# Patient Record
Sex: FEMALE | Race: WHITE | NOT HISPANIC OR LATINO | URBAN - METROPOLITAN AREA
[De-identification: names, ages, dates, MRNs, and addresses within clinical notes are randomized per-mention and may not be internally consistent; named-entity substitution may affect disease eponyms.]

---

## 2017-09-26 ENCOUNTER — EMERGENCY (EMERGENCY)
Facility: HOSPITAL | Age: 77
LOS: 1 days | Discharge: HOME | End: 2017-09-26
Attending: EMERGENCY MEDICINE
Payer: COMMERCIAL

## 2017-09-26 VITALS
TEMPERATURE: 98 F | DIASTOLIC BLOOD PRESSURE: 78 MMHG | HEART RATE: 56 BPM | HEIGHT: 66 IN | WEIGHT: 169.09 LBS | OXYGEN SATURATION: 95 % | SYSTOLIC BLOOD PRESSURE: 146 MMHG | RESPIRATION RATE: 18 BRPM

## 2017-09-26 VITALS
SYSTOLIC BLOOD PRESSURE: 142 MMHG | HEART RATE: 60 BPM | DIASTOLIC BLOOD PRESSURE: 70 MMHG | RESPIRATION RATE: 20 BRPM | OXYGEN SATURATION: 98 %

## 2017-09-26 DIAGNOSIS — Z98.89 OTHER SPECIFIED POSTPROCEDURAL STATES: Chronic | ICD-10-CM

## 2017-09-26 LAB
ALBUMIN SERPL ELPH-MCNC: 4 G/DL — SIGNIFICANT CHANGE UP (ref 3.3–5.2)
ALP SERPL-CCNC: 65 U/L — SIGNIFICANT CHANGE UP (ref 40–120)
ALT FLD-CCNC: 9 U/L — SIGNIFICANT CHANGE UP
ANION GAP SERPL CALC-SCNC: 12 MMOL/L — SIGNIFICANT CHANGE UP (ref 5–17)
AST SERPL-CCNC: 12 U/L — SIGNIFICANT CHANGE UP
BASOPHILS # BLD AUTO: 0 K/UL — SIGNIFICANT CHANGE UP (ref 0–0.2)
BASOPHILS NFR BLD AUTO: 0.3 % — SIGNIFICANT CHANGE UP (ref 0–2)
BILIRUB SERPL-MCNC: 0.3 MG/DL — LOW (ref 0.4–2)
BUN SERPL-MCNC: 26 MG/DL — HIGH (ref 8–20)
CALCIUM SERPL-MCNC: 9.4 MG/DL — SIGNIFICANT CHANGE UP (ref 8.6–10.2)
CHLORIDE SERPL-SCNC: 104 MMOL/L — SIGNIFICANT CHANGE UP (ref 98–107)
CO2 SERPL-SCNC: 27 MMOL/L — SIGNIFICANT CHANGE UP (ref 22–29)
CREAT SERPL-MCNC: 1.03 MG/DL — SIGNIFICANT CHANGE UP (ref 0.5–1.3)
EOSINOPHIL # BLD AUTO: 0.1 K/UL — SIGNIFICANT CHANGE UP (ref 0–0.5)
EOSINOPHIL NFR BLD AUTO: 1.3 % — SIGNIFICANT CHANGE UP (ref 0–6)
GLUCOSE SERPL-MCNC: 100 MG/DL — SIGNIFICANT CHANGE UP (ref 70–115)
HCT VFR BLD CALC: 43.7 % — SIGNIFICANT CHANGE UP (ref 37–47)
HGB BLD-MCNC: 13.8 G/DL — SIGNIFICANT CHANGE UP (ref 12–16)
LIDOCAIN IGE QN: 58 U/L — HIGH (ref 22–51)
LYMPHOCYTES # BLD AUTO: 1.2 K/UL — SIGNIFICANT CHANGE UP (ref 1–4.8)
LYMPHOCYTES # BLD AUTO: 13.2 % — LOW (ref 20–55)
MCHC RBC-ENTMCNC: 30.1 PG — SIGNIFICANT CHANGE UP (ref 27–31)
MCHC RBC-ENTMCNC: 31.6 G/DL — LOW (ref 32–36)
MCV RBC AUTO: 95.4 FL — SIGNIFICANT CHANGE UP (ref 81–99)
MONOCYTES # BLD AUTO: 0.4 K/UL — SIGNIFICANT CHANGE UP (ref 0–0.8)
MONOCYTES NFR BLD AUTO: 4.7 % — SIGNIFICANT CHANGE UP (ref 3–10)
NEUTROPHILS # BLD AUTO: 7.3 K/UL — SIGNIFICANT CHANGE UP (ref 1.8–8)
NEUTROPHILS NFR BLD AUTO: 80.4 % — HIGH (ref 37–73)
PLATELET # BLD AUTO: 225 K/UL — SIGNIFICANT CHANGE UP (ref 150–400)
POTASSIUM SERPL-MCNC: 4.1 MMOL/L — SIGNIFICANT CHANGE UP (ref 3.5–5.3)
POTASSIUM SERPL-SCNC: 4.1 MMOL/L — SIGNIFICANT CHANGE UP (ref 3.5–5.3)
PROT SERPL-MCNC: 6.4 G/DL — LOW (ref 6.6–8.7)
RBC # BLD: 4.58 M/UL — SIGNIFICANT CHANGE UP (ref 4.4–5.2)
RBC # FLD: 14.1 % — SIGNIFICANT CHANGE UP (ref 11–15.6)
SODIUM SERPL-SCNC: 143 MMOL/L — SIGNIFICANT CHANGE UP (ref 135–145)
WBC # BLD: 9.1 K/UL — SIGNIFICANT CHANGE UP (ref 4.8–10.8)
WBC # FLD AUTO: 9.1 K/UL — SIGNIFICANT CHANGE UP (ref 4.8–10.8)

## 2017-09-26 PROCEDURE — 80053 COMPREHEN METABOLIC PANEL: CPT

## 2017-09-26 PROCEDURE — 84484 ASSAY OF TROPONIN QUANT: CPT

## 2017-09-26 PROCEDURE — 93010 ELECTROCARDIOGRAM REPORT: CPT

## 2017-09-26 PROCEDURE — 85027 COMPLETE CBC AUTOMATED: CPT

## 2017-09-26 PROCEDURE — 83690 ASSAY OF LIPASE: CPT

## 2017-09-26 PROCEDURE — 36415 COLL VENOUS BLD VENIPUNCTURE: CPT

## 2017-09-26 PROCEDURE — 93005 ELECTROCARDIOGRAM TRACING: CPT

## 2017-09-26 PROCEDURE — 99283 EMERGENCY DEPT VISIT LOW MDM: CPT | Mod: 25

## 2017-09-26 PROCEDURE — 99285 EMERGENCY DEPT VISIT HI MDM: CPT

## 2017-09-26 NOTE — ED STATDOCS - CARE PLAN
Principal Discharge DX:	Dizziness Principal Discharge DX:	Dizziness  Secondary Diagnosis:	Epigastric pain

## 2017-09-26 NOTE — ED ADULT NURSE NOTE - OBJECTIVE STATEMENT
77 yof bibt a/ox3 co sharp epigastric pain followed by dizziness, and sob upon standing, intermittent, denies cp

## 2017-09-26 NOTE — ED STATDOCS - PROGRESS NOTE DETAILS
NP NOTE:  76 y/o F with PSH 9/19/16 CABG x 2 presents with c/o dizziness and intermittent epigastric pain.  A&O x 3, CN II-XII GI, FRYE x 4, 5/5 motors, = sensation, no ataxia, no pronator drift, negative Romberg.  Await labs. NP NOTE:  Labs reviewed, unremarkable.  Will d/c home with f/u Dr. Roman.  She has an appointment with him tomorrow at 1:45.

## 2017-09-26 NOTE — ED STATDOCS - ATTENDING CONTRIBUTION TO CARE
I, Manny Aparicio, performed the initial face to face bedside interview with this patient regarding history of present illness, review of symptoms and relevant past medical, social and family history.  I completed an independent physical examination.  I was the initial provider who evaluated this patient.  The history, relevant review of systems, past medical and surgical history, medical decision making, and physical examination was documented by the scribe in my presence and I attest to the accuracy of the documentation.  I have signed out the follow up of any pending tests (i.e. labs, radiological studies) to the ACP.  I have communicated the patient’s plan of care and disposition with the ACP.

## 2017-09-26 NOTE — ED STATDOCS - PMH
Vaughn esophagus    GERD (gastroesophageal reflux disease)    Hyperlipidemia    Hypertension    Palpitations    Paroxysmal atrial fibrillation    PVC's (premature ventricular contractions)

## 2017-09-26 NOTE — ED STATDOCS - MEDICAL DECISION MAKING DETAILS
nad neuro: CN II - XII intact, EOMI, PERRL, no papilledema, 5/5 muscle strength x 4 extremities, no sensory deficits, 2+ dtr globally, negative babinski, no ataxic gait, normal AMBROSIO and FNT, normal romberg   return to ed for intractable HA, persistent vomiting, or new onset motor/sensory deficits

## 2017-09-26 NOTE — ED STATDOCS - NEUROLOGICAL, MLM
No Kernig's or Bryzkinkis. Normal sensation all quadrants of face, normal strength in eyelids, symmetrical smile, tongue midline, normal shoulder shrugs, no pronator drift, normal thumb to other fingers, normal hand over hand, normal finger to nose, normal DTRs, normal strength in BUE and BLE, no Babinski's, normal knee-heel-shin, no Romberg's.

## 2017-09-26 NOTE — ED STATDOCS - OBJECTIVE STATEMENT
77 year old female presenting to the ED complaining of feeling dizziness and intermittent aching upper abdominal pain upon standing up x 1 week. Pt states that she has PSHx of open heart surgery. She states that she is currently on Metoprolol 25mg, Lisinopril, Simvastatin, HCTZ, and Aspirin 81mg. Denies HA, dizziness, numbness, tingling, photophobia, diplopia, change in vision/hearing/gait/mental status/speech, focal weakness, neck pain, rash, fever, chills, stiffness, abdominal pain, hx of DVT/PE, leg swelling, CP, SOB, palpitations, diaphoresis, N/V/D/C, change in urinary/bowel function, dysuria, hematuria, flank pain, malaise, or motor/sensory deficits

## 2017-11-28 ENCOUNTER — INPATIENT (INPATIENT)
Facility: HOSPITAL | Age: 77
LOS: 2 days | Discharge: HOME | DRG: 287 | End: 2017-12-01
Attending: HOSPITALIST | Admitting: FAMILY MEDICINE
Payer: COMMERCIAL

## 2017-11-28 VITALS
DIASTOLIC BLOOD PRESSURE: 56 MMHG | TEMPERATURE: 98 F | WEIGHT: 166.89 LBS | HEIGHT: 66 IN | OXYGEN SATURATION: 97 % | RESPIRATION RATE: 20 BRPM | HEART RATE: 54 BPM | SYSTOLIC BLOOD PRESSURE: 113 MMHG

## 2017-11-28 DIAGNOSIS — I49.3 VENTRICULAR PREMATURE DEPOLARIZATION: ICD-10-CM

## 2017-11-28 DIAGNOSIS — E78.5 HYPERLIPIDEMIA, UNSPECIFIED: ICD-10-CM

## 2017-11-28 DIAGNOSIS — Z29.9 ENCOUNTER FOR PROPHYLACTIC MEASURES, UNSPECIFIED: ICD-10-CM

## 2017-11-28 DIAGNOSIS — K21.9 GASTRO-ESOPHAGEAL REFLUX DISEASE WITHOUT ESOPHAGITIS: ICD-10-CM

## 2017-11-28 DIAGNOSIS — I25.10 ATHEROSCLEROTIC HEART DISEASE OF NATIVE CORONARY ARTERY WITHOUT ANGINA PECTORIS: ICD-10-CM

## 2017-11-28 DIAGNOSIS — K22.70 BARRETT'S ESOPHAGUS WITHOUT DYSPLASIA: ICD-10-CM

## 2017-11-28 DIAGNOSIS — I48.91 UNSPECIFIED ATRIAL FIBRILLATION: ICD-10-CM

## 2017-11-28 DIAGNOSIS — R07.9 CHEST PAIN, UNSPECIFIED: ICD-10-CM

## 2017-11-28 DIAGNOSIS — I10 ESSENTIAL (PRIMARY) HYPERTENSION: ICD-10-CM

## 2017-11-28 DIAGNOSIS — Z98.890 OTHER SPECIFIED POSTPROCEDURAL STATES: Chronic | ICD-10-CM

## 2017-11-28 DIAGNOSIS — Z98.89 OTHER SPECIFIED POSTPROCEDURAL STATES: Chronic | ICD-10-CM

## 2017-11-28 LAB
ALBUMIN SERPL ELPH-MCNC: 3.6 G/DL — SIGNIFICANT CHANGE UP (ref 3.3–5.2)
ALP SERPL-CCNC: 69 U/L — SIGNIFICANT CHANGE UP (ref 40–120)
ALT FLD-CCNC: 9 U/L — SIGNIFICANT CHANGE UP
ANION GAP SERPL CALC-SCNC: 11 MMOL/L — SIGNIFICANT CHANGE UP (ref 5–17)
AST SERPL-CCNC: 12 U/L — SIGNIFICANT CHANGE UP
BASOPHILS # BLD AUTO: 0 K/UL — SIGNIFICANT CHANGE UP (ref 0–0.2)
BASOPHILS NFR BLD AUTO: 0.5 % — SIGNIFICANT CHANGE UP (ref 0–2)
BILIRUB SERPL-MCNC: 0.3 MG/DL — LOW (ref 0.4–2)
BUN SERPL-MCNC: 18 MG/DL — SIGNIFICANT CHANGE UP (ref 8–20)
CALCIUM SERPL-MCNC: 9.6 MG/DL — SIGNIFICANT CHANGE UP (ref 8.6–10.2)
CHLORIDE SERPL-SCNC: 103 MMOL/L — SIGNIFICANT CHANGE UP (ref 98–107)
CO2 SERPL-SCNC: 29 MMOL/L — SIGNIFICANT CHANGE UP (ref 22–29)
CREAT SERPL-MCNC: 0.85 MG/DL — SIGNIFICANT CHANGE UP (ref 0.5–1.3)
D DIMER BLD IA.RAPID-MCNC: 216 NG/ML DDU — SIGNIFICANT CHANGE UP
EOSINOPHIL # BLD AUTO: 0.1 K/UL — SIGNIFICANT CHANGE UP (ref 0–0.5)
EOSINOPHIL NFR BLD AUTO: 1.1 % — SIGNIFICANT CHANGE UP (ref 0–6)
GLUCOSE SERPL-MCNC: 97 MG/DL — SIGNIFICANT CHANGE UP (ref 70–115)
HCT VFR BLD CALC: 41 % — SIGNIFICANT CHANGE UP (ref 37–47)
HGB BLD-MCNC: 13.2 G/DL — SIGNIFICANT CHANGE UP (ref 12–16)
INR BLD: 0.96 RATIO — SIGNIFICANT CHANGE UP (ref 0.88–1.16)
LIDOCAIN IGE QN: 36 U/L — SIGNIFICANT CHANGE UP (ref 22–51)
LYMPHOCYTES # BLD AUTO: 1.4 K/UL — SIGNIFICANT CHANGE UP (ref 1–4.8)
LYMPHOCYTES # BLD AUTO: 15.5 % — LOW (ref 20–55)
MCHC RBC-ENTMCNC: 30.8 PG — SIGNIFICANT CHANGE UP (ref 27–31)
MCHC RBC-ENTMCNC: 32.2 G/DL — SIGNIFICANT CHANGE UP (ref 32–36)
MCV RBC AUTO: 95.8 FL — SIGNIFICANT CHANGE UP (ref 81–99)
MONOCYTES # BLD AUTO: 0.5 K/UL — SIGNIFICANT CHANGE UP (ref 0–0.8)
MONOCYTES NFR BLD AUTO: 6 % — SIGNIFICANT CHANGE UP (ref 3–10)
NEUTROPHILS # BLD AUTO: 6.8 K/UL — SIGNIFICANT CHANGE UP (ref 1.8–8)
NEUTROPHILS NFR BLD AUTO: 76.7 % — HIGH (ref 37–73)
PLATELET # BLD AUTO: 252 K/UL — SIGNIFICANT CHANGE UP (ref 150–400)
POTASSIUM SERPL-MCNC: 4 MMOL/L — SIGNIFICANT CHANGE UP (ref 3.5–5.3)
POTASSIUM SERPL-SCNC: 4 MMOL/L — SIGNIFICANT CHANGE UP (ref 3.5–5.3)
PROT SERPL-MCNC: 6.3 G/DL — LOW (ref 6.6–8.7)
PROTHROM AB SERPL-ACNC: 10.6 SEC — SIGNIFICANT CHANGE UP (ref 9.8–12.7)
RBC # BLD: 4.28 M/UL — LOW (ref 4.4–5.2)
RBC # FLD: 12.7 % — SIGNIFICANT CHANGE UP (ref 11–15.6)
SODIUM SERPL-SCNC: 143 MMOL/L — SIGNIFICANT CHANGE UP (ref 135–145)
TROPONIN T SERPL-MCNC: <0.01 NG/ML — SIGNIFICANT CHANGE UP (ref 0–0.06)
WBC # BLD: 8.8 K/UL — SIGNIFICANT CHANGE UP (ref 4.8–10.8)
WBC # FLD AUTO: 8.8 K/UL — SIGNIFICANT CHANGE UP (ref 4.8–10.8)

## 2017-11-28 PROCEDURE — 93010 ELECTROCARDIOGRAM REPORT: CPT | Mod: 76

## 2017-11-28 PROCEDURE — 71010: CPT | Mod: 26

## 2017-11-28 PROCEDURE — 99285 EMERGENCY DEPT VISIT HI MDM: CPT

## 2017-11-28 PROCEDURE — 99223 1ST HOSP IP/OBS HIGH 75: CPT | Mod: GC

## 2017-11-28 RX ORDER — METOPROLOL TARTRATE 50 MG
25 TABLET ORAL DAILY
Qty: 0 | Refills: 0 | Status: DISCONTINUED | OUTPATIENT
Start: 2017-11-29 | End: 2017-11-29

## 2017-11-28 RX ORDER — FAMOTIDINE 10 MG/ML
20 INJECTION INTRAVENOUS AT BEDTIME
Qty: 0 | Refills: 0 | Status: DISCONTINUED | OUTPATIENT
Start: 2017-11-28 | End: 2017-11-29

## 2017-11-28 RX ORDER — SIMVASTATIN 20 MG/1
40 TABLET, FILM COATED ORAL AT BEDTIME
Qty: 0 | Refills: 0 | Status: DISCONTINUED | OUTPATIENT
Start: 2017-11-28 | End: 2017-12-01

## 2017-11-28 RX ORDER — HYOSCYAMINE SULFATE 0.13 MG
0.12 TABLET ORAL EVERY 8 HOURS
Qty: 0 | Refills: 0 | Status: DISCONTINUED | OUTPATIENT
Start: 2017-11-28 | End: 2017-11-28

## 2017-11-28 RX ORDER — SODIUM CHLORIDE 9 MG/ML
3 INJECTION INTRAMUSCULAR; INTRAVENOUS; SUBCUTANEOUS ONCE
Qty: 0 | Refills: 0 | Status: COMPLETED | OUTPATIENT
Start: 2017-11-28 | End: 2017-11-28

## 2017-11-28 RX ORDER — HYDROCHLOROTHIAZIDE 25 MG
12.5 TABLET ORAL DAILY
Qty: 0 | Refills: 0 | Status: DISCONTINUED | OUTPATIENT
Start: 2017-11-28 | End: 2017-12-01

## 2017-11-28 RX ORDER — ALPRAZOLAM 0.25 MG
0.25 TABLET ORAL EVERY 12 HOURS
Qty: 0 | Refills: 0 | Status: DISCONTINUED | OUTPATIENT
Start: 2017-11-28 | End: 2017-12-01

## 2017-11-28 RX ORDER — PANTOPRAZOLE SODIUM 20 MG/1
40 TABLET, DELAYED RELEASE ORAL
Qty: 0 | Refills: 0 | Status: DISCONTINUED | OUTPATIENT
Start: 2017-11-29 | End: 2017-11-29

## 2017-11-28 RX ORDER — ASPIRIN/CALCIUM CARB/MAGNESIUM 324 MG
81 TABLET ORAL DAILY
Qty: 0 | Refills: 0 | Status: DISCONTINUED | OUTPATIENT
Start: 2017-11-29 | End: 2017-12-01

## 2017-11-28 RX ORDER — ENOXAPARIN SODIUM 100 MG/ML
40 INJECTION SUBCUTANEOUS EVERY 24 HOURS
Qty: 0 | Refills: 0 | Status: DISCONTINUED | OUTPATIENT
Start: 2017-11-28 | End: 2017-12-01

## 2017-11-28 RX ORDER — LISINOPRIL 2.5 MG/1
20 TABLET ORAL DAILY
Qty: 0 | Refills: 0 | Status: DISCONTINUED | OUTPATIENT
Start: 2017-11-29 | End: 2017-12-01

## 2017-11-28 RX ADMIN — FAMOTIDINE 20 MILLIGRAM(S): 10 INJECTION INTRAVENOUS at 22:23

## 2017-11-28 RX ADMIN — SODIUM CHLORIDE 3 MILLILITER(S): 9 INJECTION INTRAMUSCULAR; INTRAVENOUS; SUBCUTANEOUS at 20:17

## 2017-11-28 RX ADMIN — SIMVASTATIN 40 MILLIGRAM(S): 20 TABLET, FILM COATED ORAL at 22:23

## 2017-11-28 NOTE — H&P ADULT - PROBLEM SELECTOR PLAN 3
Controled: 121/58  will hold lisinopril for now, will add as needed c/w statins  will do Lipid panel in the morning

## 2017-11-28 NOTE — H&P ADULT - ASSESSMENT
78 YOF w/ PMH of CAD s/p stentx2 ,ast one 09/2016, Jd esophagus, GERD, HLD, HTN, paroxismal, A-fib, bilateral cataracts admitted to rule out CAD 78 YOF w/ PMH of CAD s/p stentx2 ,ast one 09/2016, Jd esophagus, GERD, HLD, HTN, paroxismal, A-fib, bilateral cataracts admitted to rule out ACS

## 2017-11-28 NOTE — H&P ADULT - PROBLEM SELECTOR PLAN 5
Bigeminy noted on EKG  no acute st or t wave changes from previous EKG Last Endoscopy: 05/2017 as per pt  Will follow up as outpatient Last Endoscopy: 05/2017 as per pt  Saw GI yesterday and was told Last Endoscopy: 05/2017 as per pt  Saw GI yesterday and was told to continue Omeprazole and Ranitidine.

## 2017-11-28 NOTE — H&P ADULT - NSHPREVIEWOFSYSTEMS_GEN_ALL_CORE
CONSTITUTIONAL: No weakness, fevers or chills  EYES/ENT:BL cataraths:  No vertigo or throat pain   NECK: No pain or stiffness  RESPIRATORY: No cough, wheezing, hemoptysis.  CARDIOVASCULAR: No chest pain or palpitations  GASTROINTESTINAL: No nausea, vomiting, or hematemesis; No diarrhea or constipation. No melena or hematochezia.  GENITOURINARY: No dysuria, frequency or hematuria  NEUROLOGICAL: No numbness or weakness  SKIN: No itching, burning, rashes, or lesions   All other review of systems is negative unless indicated above.

## 2017-11-28 NOTE — ED ADULT NURSE NOTE - OBJECTIVE STATEMENT
patient c/o shortness of breath for 2 months with discomfort in her epigastric area, went to multiple specialists, including cardio and GI, and wasn't given an answer on her symptoms, yesterday she began having left facial side discomfort, no chest pain, has cardiac history.

## 2017-11-28 NOTE — H&P ADULT - PROBLEM SELECTOR PLAN 1
Non controlled since pt mentions reflux which has worsened recently  Likely the cause of this pt's epigastric pain since she has epigastric tenderness and the pain is reproducible when she leans forward  EKG done, will repeat in the morning  Admit to hiospitalist service, 4TW CTU  Activity: bed rest  Diet: DASH  Pt is on omeprazol 20mg and ranitidine 300mg. Will continue inpatient Liekly ACS due to this pt's high risk, vs GERD since the pain is reproducible when she leans forward  EKG done, will repeat in the morning  Admit to hiospitalist service, 4TW CTU  Activity: as tolerated  Diet: DASH  Repeat EKG in the morning  1st troponins engative, will repeat in the morning  c/w metoprolol 20mg  c/w ASA 81mg  Pt is on omeprazol 20mg and ranitidine 300mg. Will continue inpatient  cardio consulted, will wait for recommendations  Admitt to lo4TW CTU  Will do TTE (Last TTE: 09/2016,LVEF 55-60%, G1 dyastolic dysfunction) rule out ACS due to this pt's high risk, vs GERD since the pain is reproducible when she leans forward  EKG done, will repeat in the morning  Admit to hiospitalist service, 4TW CTU  Activity: as tolerated  Diet: DASH  Repeat EKG in the morning  1st troponins negative, will repeat in the morning  c/w metoprolol 20mg  c/w ASA 81mg  Pt is on omeprazol 20mg and ranitidine 300mg. Will continue inpatient  cardio consulted  Will do TTE (Last TTE: 09/2016,LVEF 55-60%, G1 dyastolic dysfunction) rule out ACS     Admit to hospitalist service, 4TW CTU  Activity: as tolerated  Diet: DASH  Repeat EKG in the morning  1st troponins negative, will repeat in the morning  c/w metoprolol 20mg  c/w ASA 81mg  c/w Simvastatin  cardio consulted  Will do TTE (Last TTE: 09/2016,LVEF 55-60%, G1 dyastolic dysfunction)

## 2017-11-28 NOTE — ED ADULT TRIAGE NOTE - CHIEF COMPLAINT QUOTE
Patient c/o SOB/STANLEY that started couple of weeks ago, last night reports jaw pain, hx of double bypass.

## 2017-11-28 NOTE — ED PROVIDER NOTE - MEDICAL DECISION MAKING DETAILS
chest pain and sob will get w/u and consult with pts cardiologist and dispo as per their recommendation

## 2017-11-28 NOTE — H&P ADULT - PROBLEM SELECTOR PLAN 2
Will rule out as the cause of chest pain since pt has a heart disease history  EKG done, will repeat in the morning  Will trend troponins  Cardio consulted, will follow recs  Last TTE: 09/2016,LVEF 55-60%, G1 dyastolic dysfunction Will rule out as the cause of chest pain since pt has a heart disease history  EKG done, will repeat in the morning  Will trend troponins  Cardio consulted, will follow recs  Last TTE: 09/2016,LVEF 55-60%, G1 dyastolic dysfunction  c/w ASA 81mg  c/w statins  c/w metoprolol 20mg Controled: 121/58  c/w lisinopril w/holding parameters Controlled: 121/58  c/w lisinopril w/holding parameters

## 2017-11-28 NOTE — H&P ADULT - NSHPPHYSICALEXAM_GEN_ALL_CORE
Vital Signs Last 24 Hrs  T(C): 36.7 (28 Nov 2017 19:17), Max: 36.7 (28 Nov 2017 19:17)  T(F): 98 (28 Nov 2017 19:17), Max: 98 (28 Nov 2017 19:17)  HR: 51 (28 Nov 2017 19:17) (51 - 54)  BP: 121/58 (28 Nov 2017 19:17) (113/56 - 121/58)  RR: 18 (28 Nov 2017 19:17) (18 - 20)  SpO2: 97% (28 Nov 2017 19:17) (97% - 97%)    Tele: bigeminy    General: WN/WD NAD  Neurology: A&Ox3, non focalization signs  Head:  Normocephalic, atraumatic  ENT:  Mucosa moist, no ulcerations  Neck:  Supple, no sinuses or palpable masses  Respiratory: CTA B/L  CV: RRR, S1S2, no murmur  Chest: surgical scar noted on sternum   Abdominal: Soft, epigastric tenderness, non distended, no palpable mass  MSK: No edema, + peripheral pulses, Vital Signs Last 24 Hrs  T(C): 36.7 (28 Nov 2017 19:17), Max: 36.7 (28 Nov 2017 19:17)  T(F): 98 (28 Nov 2017 19:17), Max: 98 (28 Nov 2017 19:17)  HR: 51 (28 Nov 2017 19:17) (51 - 54)  BP: 121/58 (28 Nov 2017 19:17) (113/56 - 121/58)  RR: 18 (28 Nov 2017 19:17) (18 - 20)  SpO2: 97% (28 Nov 2017 19:17) (97% - 97%)    Tele: bigeminy    General: WN/WD NAD  Neurology: A&Ox3, non focalization signs  Head:  Normocephalic, atraumatic  ENT:  Mucosa moist, no ulcerations  Neck:  Supple, no sinuses or palpable masses  Respiratory: CTA B/L  CV: bradycardic, regular rhythm, S1S2, no murmur  Chest: surgical scar noted on sternum   Abdominal: Soft, epigastric tenderness, non distended, no palpable mass  MSK: No edema, + peripheral pulses,

## 2017-11-28 NOTE — ED PROVIDER NOTE - MUSCULOSKELETAL, MLM
Spine appears normal, range of motion is not limited, no muscle or joint tenderness except chest left upper chest wall tenderness reproduces pts symptoms of chest pain exactly

## 2017-11-28 NOTE — H&P ADULT - PROBLEM SELECTOR PLAN 4
Paroxysmal since now is on sinus rythm  c/w metoprolol 20mg   Cardio consulted, will wait for recs Paroxysmal since now is on sinus rhythm, not on anticoagulation as per cardio recs  c/w metoprolol 20mg   Cardio consulted, will wait for recs Paroxysmal since now is on sinus rhythm, not on anticoagulation   c/w metoprolol 20mg and aspirin 81 mg

## 2017-11-28 NOTE — H&P ADULT - HISTORY OF PRESENT ILLNESS
78 YOF w/ PMH of CAD s/p stentx2 ,ast one 09/2016, Jd esophagus, GERD, HLD, HTN, paroxismal, A-fib, bilateral cataracts  who came frome home for an "epigastric spasm sensation' that has been intermittent for the past 2 months, no related to meals or activity, non radiated, aggravated when she leans forward but not with deep inspirations, with no alleviating factors, that last night presented with SOB, radiated to her jaw but not arms. Pt denies chest pain, weakness, nausea, vomiting, palpitations, diarrhea.    In the ED 1st troponins were negative, EKG showed non specific t wave abnormalities but no acute st changes. On Monitor pt resented PVCs; EKG was repeated, showed bigeminy. 78 YOF w/ PMH of CAD s/p stentx2 ,ast one 09/2016, Jd esophagus, GERD, HLD, HTN, paroxismal, A-fib, bilateral cataracts  who came frome home for an "epigastric spasm sensation' that has been intermittent for the past 2 months, no related to meals or activity, non radiated, aggravated when she leans forward but not with deep inspirations, with no alleviating factors, that last night presented with SOB, radiated to her jaw but not arms. Pt denies chest pain, weakness, nausea, vomiting, palpitations, diarrhea.    In the ED 1st troponins were negative, EKG showed non specific t wave abnormalities but no acute st changes. On Monitor pt resented PVCs; EKG was repeated, showed bigeminy but no st or t waves changes from previous EKG 78 YOF w/ PMH of CAD s/p stentx2 ,ast one 09/2016, Jd esophagus, GERD, HLD, HTN, paroxismal, A-fib, bilateral cataracts  who came frome home for an "epigastric spasm sensation' that has been intermittent for the past 2 months, no related to meals or activity, non radiated, aggravated when she leans forward but not with deep inspirations, with no alleviating factors. Last night presented with SOB, radiated to her jaw but not arms. She also esophageal reflux which has worsened recently. Pt denies chest pain, weakness, nausea, vomiting, palpitations, diarrhea.    In the ED 1st troponins were negative, EKG showed non specific t wave abnormalities but no acute st changes. On Monitor pt resented PVCs; EKG was repeated, showed bigeminy but no st or t waves changes from previous EKG

## 2017-11-28 NOTE — H&P ADULT - PROBLEM SELECTOR PLAN 7
c/w statins will rend troponins and repeat EKG  Last TTE: 09/2016,LVEF 55-60%, G1 dyastolic dysfunction  c/w ASA 81mg  c/w statins  c/w metoprolol 20mg

## 2017-11-28 NOTE — H&P ADULT - PROBLEM SELECTOR PLAN 6
Last Endoscopy: 05/2017 as per pt  Will folllow up as outpatient Non controlled since pt mentions reflux which has worsened recently  Diet: HECTOR  Pt is on omeprazol 20mg and ranitidine 300mg. Will continue inpatient Lovenox 40mg sc daily

## 2017-11-28 NOTE — H&P ADULT - NSHPLABSRESULTS_GEN_ALL_CORE
CBC Full  -  ( 28 Nov 2017 19:45 )  WBC Count : 8.8 K/uL  Hemoglobin : 13.2 g/dL  Hematocrit : 41.0 %  Platelet Count - Automated : 252 K/uL  Mean Cell Volume : 95.8 fl  Mean Cell Hemoglobin : 30.8 pg  Mean Cell Hemoglobin Concentration : 32.2 g/dL  Auto Neutrophil # : 6.8 K/uL  Auto Lymphocyte # : 1.4 K/uL  Auto Monocyte # : 0.5 K/uL  Auto Eosinophil # : 0.1 K/uL  Auto Basophil # : 0.0 K/uL  Auto Neutrophil % : 76.7 %  Auto Lymphocyte % : 15.5 %  Auto Monocyte % : 6.0 %  Auto Eosinophil % : 1.1 %  Auto Basophil % : 0.5 %      11-28    143  |  103  |  18.0  ----------------------------<  97  4.0   |  29.0  |  0.85    Ca    9.6      28 Nov 2017 19:45    TPro  6.3<L>  /  Alb  3.6  /  TBili  0.3<L>  /  DBili  x   /  AST  12  /  ALT  9   /  AlkPhos  69  11-28      Troponin T, Serum (11.28.17 @ 19:45)    Troponin T, Serum: <0.01: Reference Interval for Troponin T  Less than 0.06 ng/mL - includes the 99th percentile of a healthy  population at a method C.V. of 10% or less.  NOTE: Troponin T is measured by the Roche CLIA method and these  results are not interchangable with Troponin I results since they are  different assays with different reference intervals. ng/mL

## 2017-11-28 NOTE — H&P ADULT - FAMILY HISTORY
Father  Still living? Unknown  Family history of diabetes mellitus, Age at diagnosis: Age Unknown  Family history of heart disease, Age at diagnosis: Age Unknown  Family history of hypertension, Age at diagnosis: Age Unknown     Mother  Still living? Unknown  Family history of diabetes mellitus, Age at diagnosis: Age Unknown  Family history of hypertension, Age at diagnosis: Age Unknown  Family history of esophageal cancer, Age at diagnosis: Age Unknown

## 2017-11-28 NOTE — H&P ADULT - NSHPOUTPATIENTPROVIDERS_GEN_ALL_CORE
PMD: Guerrero Zavala ([phone])  Cardio: Dr Hodge PMD: Guerrero Zavala ([phone])  Cardio: Dr Hodge  GI: Aiden Maynard

## 2017-11-28 NOTE — ED PROVIDER NOTE - OBJECTIVE STATEMENT
76 y/o female with a h/o cad and s/o cabg x 2 and she had nl stress echo 1 year ago she says and over the past 2 months she has had sob and upper left chest pain and she saw her cardiologist Dr Hodge and he told her it was her stomach causing sob ? refux and she has been treating this but pain and sob still present and last night she also had jaw pain so came for eval the chest pain is reproducible on exam and she says this has not changed and she was told it was probably related to her getting a bypass surgery

## 2017-11-28 NOTE — H&P ADULT - ATTENDING COMMENTS
Patient was seen and evaluated in ER to rule out ACS. Follow cardiac enzymes and echo. Cardio Consult in am. She is following GI for Vaughn's Esophagus. Continue home medications.

## 2017-11-29 DIAGNOSIS — R10.13 EPIGASTRIC PAIN: ICD-10-CM

## 2017-11-29 DIAGNOSIS — I10 ESSENTIAL (PRIMARY) HYPERTENSION: ICD-10-CM

## 2017-11-29 DIAGNOSIS — I25.810 ATHEROSCLEROSIS OF CORONARY ARTERY BYPASS GRAFT(S) WITHOUT ANGINA PECTORIS: ICD-10-CM

## 2017-11-29 DIAGNOSIS — K21.9 GASTRO-ESOPHAGEAL REFLUX DISEASE WITHOUT ESOPHAGITIS: ICD-10-CM

## 2017-11-29 DIAGNOSIS — I48.0 PAROXYSMAL ATRIAL FIBRILLATION: ICD-10-CM

## 2017-11-29 DIAGNOSIS — R00.1 BRADYCARDIA, UNSPECIFIED: ICD-10-CM

## 2017-11-29 LAB
ALBUMIN SERPL ELPH-MCNC: 3.4 G/DL — SIGNIFICANT CHANGE UP (ref 3.3–5.2)
ALP SERPL-CCNC: 63 U/L — SIGNIFICANT CHANGE UP (ref 40–120)
ALT FLD-CCNC: 7 U/L — SIGNIFICANT CHANGE UP
ANION GAP SERPL CALC-SCNC: 12 MMOL/L — SIGNIFICANT CHANGE UP (ref 5–17)
AST SERPL-CCNC: 10 U/L — SIGNIFICANT CHANGE UP
BILIRUB DIRECT SERPL-MCNC: 0.1 MG/DL — SIGNIFICANT CHANGE UP (ref 0–0.3)
BILIRUB INDIRECT FLD-MCNC: 0.2 MG/DL — SIGNIFICANT CHANGE UP (ref 0.2–1)
BILIRUB SERPL-MCNC: 0.3 MG/DL — LOW (ref 0.4–2)
BUN SERPL-MCNC: 18 MG/DL — SIGNIFICANT CHANGE UP (ref 8–20)
CALCIUM SERPL-MCNC: 9.5 MG/DL — SIGNIFICANT CHANGE UP (ref 8.6–10.2)
CHLORIDE SERPL-SCNC: 104 MMOL/L — SIGNIFICANT CHANGE UP (ref 98–107)
CHOLEST SERPL-MCNC: 167 MG/DL — SIGNIFICANT CHANGE UP (ref 110–199)
CK SERPL-CCNC: 32 U/L — SIGNIFICANT CHANGE UP (ref 25–170)
CO2 SERPL-SCNC: 29 MMOL/L — SIGNIFICANT CHANGE UP (ref 22–29)
CREAT SERPL-MCNC: 0.9 MG/DL — SIGNIFICANT CHANGE UP (ref 0.5–1.3)
GLUCOSE SERPL-MCNC: 83 MG/DL — SIGNIFICANT CHANGE UP (ref 70–115)
HCT VFR BLD CALC: 39 % — SIGNIFICANT CHANGE UP (ref 37–47)
HDLC SERPL-MCNC: 53 MG/DL — LOW
HGB BLD-MCNC: 12.6 G/DL — SIGNIFICANT CHANGE UP (ref 12–16)
LIDOCAIN IGE QN: 34 U/L — SIGNIFICANT CHANGE UP (ref 22–51)
LIPID PNL WITH DIRECT LDL SERPL: 94 MG/DL — SIGNIFICANT CHANGE UP
MAGNESIUM SERPL-MCNC: 1.7 MG/DL — SIGNIFICANT CHANGE UP (ref 1.6–2.6)
MCHC RBC-ENTMCNC: 31 PG — SIGNIFICANT CHANGE UP (ref 27–31)
MCHC RBC-ENTMCNC: 32.3 G/DL — SIGNIFICANT CHANGE UP (ref 32–36)
MCV RBC AUTO: 96.1 FL — SIGNIFICANT CHANGE UP (ref 81–99)
PHOSPHATE SERPL-MCNC: 3.9 MG/DL — SIGNIFICANT CHANGE UP (ref 2.4–4.7)
PLATELET # BLD AUTO: 240 K/UL — SIGNIFICANT CHANGE UP (ref 150–400)
POTASSIUM SERPL-MCNC: 3.9 MMOL/L — SIGNIFICANT CHANGE UP (ref 3.5–5.3)
POTASSIUM SERPL-SCNC: 3.9 MMOL/L — SIGNIFICANT CHANGE UP (ref 3.5–5.3)
PROT SERPL-MCNC: 5.8 G/DL — LOW (ref 6.6–8.7)
RBC # BLD: 4.06 M/UL — LOW (ref 4.4–5.2)
RBC # FLD: 12.9 % — SIGNIFICANT CHANGE UP (ref 11–15.6)
SODIUM SERPL-SCNC: 145 MMOL/L — SIGNIFICANT CHANGE UP (ref 135–145)
TOTAL CHOLESTEROL/HDL RATIO MEASUREMENT: 3 RATIO — LOW (ref 3.3–7.1)
TRIGL SERPL-MCNC: 100 MG/DL — SIGNIFICANT CHANGE UP (ref 10–200)
TROPONIN T SERPL-MCNC: <0.01 NG/ML — SIGNIFICANT CHANGE UP (ref 0–0.06)
WBC # BLD: 7.6 K/UL — SIGNIFICANT CHANGE UP (ref 4.8–10.8)
WBC # FLD AUTO: 7.6 K/UL — SIGNIFICANT CHANGE UP (ref 4.8–10.8)

## 2017-11-29 PROCEDURE — 76705 ECHO EXAM OF ABDOMEN: CPT | Mod: 26

## 2017-11-29 PROCEDURE — 99233 SBSQ HOSP IP/OBS HIGH 50: CPT

## 2017-11-29 PROCEDURE — 93010 ELECTROCARDIOGRAM REPORT: CPT

## 2017-11-29 PROCEDURE — 93459 L HRT ART/GRFT ANGIO: CPT | Mod: 26

## 2017-11-29 RX ORDER — SUCRALFATE 1 G
1 TABLET ORAL
Qty: 0 | Refills: 0 | Status: DISCONTINUED | OUTPATIENT
Start: 2017-11-29 | End: 2017-11-30

## 2017-11-29 RX ORDER — PANTOPRAZOLE SODIUM 20 MG/1
40 TABLET, DELAYED RELEASE ORAL
Qty: 0 | Refills: 0 | Status: DISCONTINUED | OUTPATIENT
Start: 2017-11-29 | End: 2017-11-30

## 2017-11-29 RX ORDER — MAGNESIUM SULFATE 500 MG/ML
2 VIAL (ML) INJECTION ONCE
Qty: 0 | Refills: 0 | Status: COMPLETED | OUTPATIENT
Start: 2017-11-29 | End: 2017-11-29

## 2017-11-29 RX ORDER — SODIUM CHLORIDE 9 MG/ML
1000 INJECTION INTRAMUSCULAR; INTRAVENOUS; SUBCUTANEOUS
Qty: 0 | Refills: 0 | Status: DISCONTINUED | OUTPATIENT
Start: 2017-11-29 | End: 2017-12-01

## 2017-11-29 RX ADMIN — PANTOPRAZOLE SODIUM 40 MILLIGRAM(S): 20 TABLET, DELAYED RELEASE ORAL at 16:39

## 2017-11-29 RX ADMIN — Medication 12.5 MILLIGRAM(S): at 06:00

## 2017-11-29 RX ADMIN — Medication 81 MILLIGRAM(S): at 11:26

## 2017-11-29 RX ADMIN — Medication 30 MILLILITER(S): at 22:21

## 2017-11-29 RX ADMIN — Medication 1 GRAM(S): at 16:39

## 2017-11-29 RX ADMIN — SIMVASTATIN 40 MILLIGRAM(S): 20 TABLET, FILM COATED ORAL at 22:21

## 2017-11-29 RX ADMIN — LISINOPRIL 20 MILLIGRAM(S): 2.5 TABLET ORAL at 06:00

## 2017-11-29 RX ADMIN — Medication 50 GRAM(S): at 11:20

## 2017-11-29 RX ADMIN — PANTOPRAZOLE SODIUM 40 MILLIGRAM(S): 20 TABLET, DELAYED RELEASE ORAL at 07:02

## 2017-11-29 NOTE — PROGRESS NOTE ADULT - SUBJECTIVE AND OBJECTIVE BOX
· Provider Specialty	Cardiology      · Subjective and Objective:   Patient pre [facility]      REVIEW OF SYSTEMS:  Denies SOB, CP, NV, HA, dizziness at palpitations at this time.    PHYSICAL EXAM: A&Ox3 NAD Skin warm and dry  NEURO: Speech intact +gag +swallow Tongue midline FRYE Mallampati II  NECK: No JVD, trachea midline. Eupneic  HEART: RRR S1S2 no g/m Tele/SB no ectopy  PULMONARY:  CTA ulisses  EXTREMITIES: RFA site: No bleed, hematoma, pain, ecchymosis or swelling Rt DP/PT+ by doppler.            Assessment and Plan:   · Assessment	  A- Pre-C  Mallampati II  ASA 3       Problem/Plan - 1:  ·  Problem: CAD (coronary artery disease).   -c/o SOB  -Plan [facility]  -Serum Mg - 1.7  -Plan 2 Gms Mag IVPB

## 2017-11-29 NOTE — PROGRESS NOTE ADULT - SUBJECTIVE AND OBJECTIVE BOX
Patient is a 77y old  Female who presents with a chief complaint of Epigastric pain on off for 2 months , pain associated with shortness of breath and dizziness   bradycardia  on cardiac monitor asymptomatic , metoprolol held this am , will stop pending cardiology consult today     HPI:  78 YOF w/ PMH of CAD s/p stentx2 ,ast one 09/2016, Jd esophagus, GERD, HLD, HTN, paroxismal, A-fib, bilateral cataracts  who came frome home for an "epigastric spasm sensation' that has been intermittent for the past 2 months, no related to meals or activity, non radiated, aggravated when she leans forward but not with deep inspirations, with no alleviating factors. Last night presented with SOB, radiated to her jaw but not arms. She also esophageal reflux which has worsened recently. Pt denies chest pain, weakness, nausea, vomiting, palpitations, diarrhea. She had seen her GI doctor yesterday and  advised her to see cardiologist     In the ED 1st troponin  were negative, EKG showed non specific t wave abnormalities but no acute st changes. On Monitor pt resented PVCs; EKG was repeated, showed bigeminy but no st or t waves changes from previous EKG (28 Nov 2017 20:23)      Allergies    No Known Allergies    Intolerances        REVIEW OF SYSTEMS:  abdominal pain , shortness of breath dizziness during abdominal pain , no nausea , denies chest pain     Vital Signs Last 24 Hrs  T(C): 36.2 (29 Nov 2017 05:41), Max: 36.7 (28 Nov 2017 19:17)  T(F): 97.2 (29 Nov 2017 05:41), Max: 98 (28 Nov 2017 19:17)  HR: 44 (29 Nov 2017 05:41) (44 - 82)  BP: 118/64 (29 Nov 2017 05:41) (113/56 - 150/68)  BP(mean): --  RR: 16 (29 Nov 2017 05:41) (16 - 20)  SpO2: 98% (29 Nov 2017 05:41) (96% - 98%)    PHYSICAL EXAM:    GENERAL: NAD, well-groomed, well-developed  NECK: Supple, No JVD, Normal thyroid  CHEST/LUNG: Clear to auscultation  bilaterally; No rales, rhonchi, wheezing  HEART: Regular rate and rhythm; No murmurs, rubs, or gallops  ABDOMEN: Soft, RUQ epigastric tenderness on palpation . Nondistended; Bowel sounds present  EXTREMITIES:  2+ Peripheral Pulses, No clubbing, cyanosis, or edema      LABS:                        12.6   7.6   )-----------( 240      ( 29 Nov 2017 05:57 )             39.0     11-29    145  |  104  |  18.0  ----------------------------<  83  3.9   |  29.0  |  0.90    Ca    9.5      29 Nov 2017 05:57  Phos  3.9     11-29  Mg     1.7     11-29    TPro  6.3<L>  /  Alb  3.6  /  TBili  0.3<L>  /  DBili  x   /  AST  12  /  ALT  9   /  AlkPhos  69  11-28    PT/INR - ( 28 Nov 2017 19:45 )   PT: 10.6 sec;   INR: 0.96 ratio               RADIOLOGY & ADDITIONAL TESTS:

## 2017-11-29 NOTE — CONSULT NOTE ADULT - SUBJECTIVE AND OBJECTIVE BOX
CHIEF COMPLAINT: epigastric pain    HPI: Patient is a  77y Female h/o CAD s/p CABG in 2016, Barretts esophagus, HTN, HLD here with a couple months of exertional epigastric pain. Seems to mostly occur with waling and takes her breath away. Resolves after rest. Sometimes occurs at rest as well. Not positional/pleuritic or associated with nausea/vomiting diaphoresis. Not related to meals. Never had this in past. Different from GERD she has had. No PND/orthopnea/palps/edema.     PAST MEDICAL & SURGICAL HISTORY:  Palpitations  Vaughn esophagus  GERD (gastroesophageal reflux disease)  PVC's (premature ventricular contractions)  Paroxysmal atrial fibrillation  Hypertension  Hyperlipidemia  Status post endoscopy: 05/2017  History of open heart surgery      MEDICATIONS:   ferrous sulfate: 325 milligram(s) orally once a day  · 	simvastatin 40 mg oral tablet: 1 tab(s) orally once a day (at bedtime)  · 	docusate sodium 100 mg oral capsule: 1 cap(s) orally 3 times a day  · 	metoprolol tartrate 25 mg oral tablet: 1 tab(s) orally every 12 hours  · 	ascorbic acid 500 mg oral tablet: 1 tab(s) orally once a day, Last Dose Taken:    · 	folic acid 1 mg oral tablet: 1 tab(s) orally once a day  · 	omeprazole-sodium bicarbonate 40 mg-1100 mg oral capsule: 1 cap(s) orally once a day  · 	PreserVision oral capsule: 1 cap(s) orally 2 times a day  · 	ranitidine 300 mg oral tablet: 1 tab(s) orally once a day (at bedtime)  · 	hydroCHLOROthiazide 12.5 mg oral capsule: 1 cap(s) orally once a day      FAMILY HISTORY:  FAMILY HISTORY:  Family history of esophageal cancer (Mother)  Family history of hypertension (Father, Mother)  Family history of heart disease (Father)  Family history of diabetes mellitus (Father, Mother)      SOCIAL HISTORY: no EtOH, drugs or tobacco    ROS:  positive for recent UTI with dysuria now resolved, All others negative    PHYSCIAL EXAM:  Vital Signs Last 24 Hrs  T(C): 36.2 (29 Nov 2017 05:41), Max: 36.7 (28 Nov 2017 19:17)  T(F): 97.2 (29 Nov 2017 05:41), Max: 98 (28 Nov 2017 19:17)  HR: 44 (29 Nov 2017 05:41) (44 - 82)  BP: 118/64 (29 Nov 2017 05:41) (113/56 - 150/68)  BP(mean): --  RR: 16 (29 Nov 2017 05:41) (16 - 20)  SpO2: 98% (29 Nov 2017 05:41) (96% - 98%)  I&O's Summary    28 Nov 2017 07:01  -  29 Nov 2017 07:00  --------------------------------------------------------  IN: 240 mL / OUT: 0 mL / NET: 240 mL      GEN: NAD  HEENT: MMM, sclera anicteric  RESP: CTA bilaterally  CVS: S1S2, RRR, no JVD, no M/R/G  GI: Soft, mild epigastric TTP without rebound, different from exertional pain  EXT: no C/C/E  NEURO: AAOX3  PSYCH: Normal affect    ECG: SB, NSST (unchanged from prior)  TEle: SB    LABS:                        12.6   7.6   )-----------( 240      ( 29 Nov 2017 05:57 )             39.0     11-29    145  |  104  |  18.0  ----------------------------<  83  3.9   |  29.0  |  0.90    Ca    9.5      29 Nov 2017 05:57  Phos  3.9     11-29  Mg     1.7     11-29    TPro  5.8<L>  /  Alb  3.4  /  TBili  0.3<L>  /  DBili  0.1  /  AST  10  /  ALT  7   /  AlkPhos  63  11-29    CARDIAC MARKERS ( 29 Nov 2017 05:57 )  x     / <0.01 ng/mL / 32 U/L / x     / x      CARDIAC MARKERS ( 28 Nov 2017 19:45 )  x     / <0.01 ng/mL / x     / x     / x          PT/INR - ( 28 Nov 2017 19:45 )   PT: 10.6 sec;   INR: 0.96 ratio               RADIOLOGY & ADDITIONAL STUDIES:    Assessment:    Patient is a 78 yo F with a PMH of CAD s/p CABG, HTN, HLD, PAF presenting with exertional epigastric pain and SOB. No evidence ACS but somewhat concerning for angina given SOB and exertional component. GI work up has been unrevealing so far. Recommend cath to definitively evaluate symptoms, if negative pursue further GI work up, ? mesenteric ischemia although pain not after meals.     Plan:  1. Follow up echo  2. Cath  3. Continue to hold beta blocker for viktor, will likely add back lower dose  4. ASA/statin  5. further management pending cath  6. Thanks!

## 2017-11-29 NOTE — PROGRESS NOTE ADULT - SUBJECTIVE AND OBJECTIVE BOX
· Provider Specialty	Cardiology      · Subjective and Objective:   s/p diagnostic LHC via R groin site which revealed patent grafts, LIMA to the LAD and SVG to the Circ, and an 80% blockage to the LM that was present prior to CABG that leaves a large diag (Farias) not bypassed.  EF= 40-45%. Mynx closure at R peggy site.      REVIEW OF SYSTEMS:  Denies SOB, CP, NV, HA, dizziness, palpitations, site pain.    PHYSICAL EXAM: A&Ox3 NAD Skin warm and dry  NEURO: Speech intact +gag +swallow Tongue midline FRYE  NECK: No JVD, trachea midline. Eupneic  HEART: RRR S1S2 Tele SB no ectopy  PULMONARY:  CTA ulisses  ABDOMEN: Soft nontender X4 +BS   EXTREMITIES: RFA site: No bleed, hematoma, pain, ecchymosis or swelling Rt DP/PT+ by doppler.      Assessment and Plan:   · Assessment	  A-s/p diagnostic LHC       Problem/Plan - 1:  ·  Problem: CAD (coronary artery disease).   -Plan: Right groin care instructions   - Patient concerned as to planned cataract surgery  - Stress test and ECHO to evaluate ischemic burden of ramus. If positive will stent after eye surgery.   -compliance with diet, exercise, medications and f/u d/w pt  -Continue prior medications  FU ___Jeovanny as outpatient after Stress and Echo · Provider Specialty	Cardiology      · Subjective and Objective:   s/p diagnostic LHC via R groin site which revealed patent grafts, LIMA to the LAD and SVG to the Circ, and an 80% blockage to the LM that was present prior to CABG that leaves a large diag (Farias) not bypassed.  EF= 40-45%. EDP- 7-8.  Mynx closure at R peggy site.      REVIEW OF SYSTEMS:  Denies SOB, CP, NV, HA, dizziness, palpitations, site pain.    PHYSICAL EXAM: A&Ox3 NAD Skin warm and dry  NEURO: Speech intact +gag +swallow Tongue midline FRYE  NECK: No JVD, trachea midline. Eupneic  HEART: RRR S1S2 Tele SB no ectopy  PULMONARY:  CTA ulisses  ABDOMEN: Soft nontender X4 +BS   EXTREMITIES: RFA site: No bleed, hematoma, pain, ecchymosis or swelling Rt DP/PT+ by doppler.      Assessment and Plan:   · Assessment	  A-s/p diagnostic LHC       Problem/Plan - 1:  ·  Problem: CAD (coronary artery disease).   -Plan: Right groin care instructions   -EDP 7-8  - Hydrate with NS 100cc/hr x 4 hours  - Patient concerned as to planned cataract surgery  - Stress test and ECHO to evaluate ischemic burden of ramus. If positive will stent after eye surgery.   -compliance with diet, exercise, medications and f/u d/w pt  -Continue prior medications  FU ___Jeovanny as outpatient after Stress and Echo

## 2017-11-30 PROCEDURE — 99233 SBSQ HOSP IP/OBS HIGH 50: CPT

## 2017-11-30 PROCEDURE — 74176 CT ABD & PELVIS W/O CONTRAST: CPT | Mod: 26

## 2017-11-30 PROCEDURE — 99253 IP/OBS CNSLTJ NEW/EST LOW 45: CPT

## 2017-11-30 RX ORDER — PANTOPRAZOLE SODIUM 20 MG/1
40 TABLET, DELAYED RELEASE ORAL EVERY 12 HOURS
Qty: 0 | Refills: 0 | Status: DISCONTINUED | OUTPATIENT
Start: 2017-11-30 | End: 2017-12-01

## 2017-11-30 RX ORDER — METOPROLOL TARTRATE 50 MG
12.5 TABLET ORAL
Qty: 0 | Refills: 0 | Status: DISCONTINUED | OUTPATIENT
Start: 2017-11-30 | End: 2017-11-30

## 2017-11-30 RX ORDER — METOPROLOL TARTRATE 50 MG
12.5 TABLET ORAL
Qty: 0 | Refills: 0 | Status: DISCONTINUED | OUTPATIENT
Start: 2017-11-30 | End: 2017-12-01

## 2017-11-30 RX ORDER — OXYCODONE AND ACETAMINOPHEN 5; 325 MG/1; MG/1
1 TABLET ORAL EVERY 6 HOURS
Qty: 0 | Refills: 0 | Status: DISCONTINUED | OUTPATIENT
Start: 2017-11-30 | End: 2017-12-01

## 2017-11-30 RX ADMIN — PANTOPRAZOLE SODIUM 40 MILLIGRAM(S): 20 TABLET, DELAYED RELEASE ORAL at 17:17

## 2017-11-30 RX ADMIN — Medication 1 GRAM(S): at 12:16

## 2017-11-30 RX ADMIN — Medication 12.5 MILLIGRAM(S): at 06:04

## 2017-11-30 RX ADMIN — OXYCODONE AND ACETAMINOPHEN 1 TABLET(S): 5; 325 TABLET ORAL at 12:16

## 2017-11-30 RX ADMIN — SIMVASTATIN 40 MILLIGRAM(S): 20 TABLET, FILM COATED ORAL at 21:46

## 2017-11-30 RX ADMIN — LISINOPRIL 20 MILLIGRAM(S): 2.5 TABLET ORAL at 06:04

## 2017-11-30 RX ADMIN — OXYCODONE AND ACETAMINOPHEN 1 TABLET(S): 5; 325 TABLET ORAL at 13:00

## 2017-11-30 RX ADMIN — Medication 81 MILLIGRAM(S): at 12:16

## 2017-11-30 RX ADMIN — PANTOPRAZOLE SODIUM 40 MILLIGRAM(S): 20 TABLET, DELAYED RELEASE ORAL at 06:04

## 2017-11-30 RX ADMIN — ENOXAPARIN SODIUM 40 MILLIGRAM(S): 100 INJECTION SUBCUTANEOUS at 21:46

## 2017-11-30 RX ADMIN — Medication 1 GRAM(S): at 07:51

## 2017-11-30 NOTE — DIETITIAN INITIAL EVALUATION ADULT. - OTHER INFO
Pt NPO for CT scan, but normally with good po intake at meals.   Pt with epigastric pain upon admission, but has not affected po intake.Pt usually follows DASH/TLC meal plan at home because that is what her  has to follow.  Pt demonstrates good understanding of meal plan.

## 2017-11-30 NOTE — CONSULT NOTE ADULT - ASSESSMENT
Patient with epigastric pain and has some duodenal bulb thickening. Cath does not explain her pain. Patient has no alarm symptoms at this time.     1. Agree with PPI bid and assess the response. Other option is to consider carafate  2. Can follow up with her GI physician   3. No need to perform EGD at this time Patient with epigastric pain and has some duodenal bulb thickening. Cath does not explain her pain. Patient has no alarm symptoms at this time.     1. Agree with PPI bid and assess the response. Other option is to consider carafate 1 gram po qid  2. Can follow up with her GI physician   3. No need to perform EGD at this time

## 2017-11-30 NOTE — CONSULT NOTE ADULT - SUBJECTIVE AND OBJECTIVE BOX
HPI:  78 YOF w/ PMH of CAD s/p CABG 09/2016, Jd esophagus, GERD, HLD, HTN, paroxismal, A-fib, bilateral cataracts  who came frome home for an "epigastric spasm sensation' that has been intermittent for the past 2 months, no related to meals or activity, non radiated, aggravated when she leans forward but not with deep inspirations, with no alleviating factors. Last night presented with SOB, radiated to her jaw but not arms. She also esophageal reflux which has worsened recently. Pt denies chest pain, weakness, nausea, vomiting, palpitations, diarrhea.    In the ED 1st troponins were negative, EKG showed non specific t wave abnormalities but no acute st changes. On Monitor pt resented PVCs; EKG was repeated, showed bigeminy but no st or t waves changes from previous EKG (28 Nov 2017 20:23)      PAST MEDICAL & SURGICAL HISTORY:  Palpitations  Vaughn esophagus  GERD (gastroesophageal reflux disease)  PVC's (premature ventricular contractions)  Paroxysmal atrial fibrillation  Hypertension  Hyperlipidemia  Status post endoscopy: 05/2017  History of open heart surgery      ROS:  No Heartburn, regurgitation, dysphagia, odynophagia.  No dyspepsia  No abdominal pain.    No Nausea, vomiting.  No Bleeding.  No hematemesis.   No diarrhea.    No hematochesia.  No weight loss, anorexia.  No edema.      MEDICATIONS  (STANDING):  aspirin  chewable 81 milliGRAM(s) Oral daily  enoxaparin Injectable 40 milliGRAM(s) SubCutaneous every 24 hours  hydrochlorothiazide 12.5 milliGRAM(s) Oral daily  lisinopril 20 milliGRAM(s) Oral daily  metoprolol     tartrate 12.5 milliGRAM(s) Oral two times a day  pantoprazole  Injectable 40 milliGRAM(s) IV Push every 12 hours  simvastatin 40 milliGRAM(s) Oral at bedtime  sodium chloride 0.9%. 1000 milliLiter(s) (100 mL/Hr) IV Continuous <Continuous>    MEDICATIONS  (PRN):  ALPRAZolam 0.25 milliGRAM(s) Oral every 12 hours PRN anxiety  aluminum hydroxide/magnesium hydroxide/simethicone Suspension 30 milliLiter(s) Oral every 4 hours PRN Dyspepsia  oxyCODONE    5 mG/acetaminophen 325 mG 1 Tablet(s) Oral every 6 hours PRN Moderate Pain (4 - 6)      Allergies    No Known Allergies    Intolerances        SOCIAL HISTORY:    ENDOSCOPIC/GI HISTORY:    FAMILY HISTORY:  Family history of esophageal cancer (Mother)  Family history of hypertension (Father, Mother)  Family history of heart disease (Father)  Family history of diabetes mellitus (Father, Mother)      Vital Signs Last 24 Hrs  T(C): 36.7 (30 Nov 2017 17:17), Max: 36.8 (29 Nov 2017 22:15)  T(F): 98.1 (30 Nov 2017 17:17), Max: 98.2 (29 Nov 2017 22:15)  HR: 50 (30 Nov 2017 17:17) (50 - 68)  BP: 112/60 (30 Nov 2017 17:17) (110/60 - 148/73)  BP(mean): --  RR: 18 (30 Nov 2017 17:17) (16 - 18)  SpO2: 100% (30 Nov 2017 17:17) (88% - 100%)    PHYSICAL EXAM:    GENERAL: NAD, well-groomed, well-developed  HEAD:  Atraumatic, Normocephalic  EYES: EOMI, PERRLA, conjunctiva and sclera clear  ENMT: No tonsillar erythema, exudates, or enlargement; Moist mucous membranes, Good dentition, No lesions  NECK: Supple, No JVD, Normal thyroid  CHEST/LUNG: Clear to percussion bilaterally; No rales, rhonchi, wheezing, or rubs  HEART: Regular rate and rhythm; No murmurs, rubs, or gallops  ABDOMEN: Soft, Nontender, Nondistended; Bowel sounds present  EXTREMITIES:  2+ Peripheral Pulses, No clubbing, cyanosis, or edema  LYMPH: No lymphadenopathy noted  SKIN: No rashes or lesions      LABS:                        12.6   7.6   )-----------( 240      ( 29 Nov 2017 05:57 )             39.0     11-29    145  |  104  |  18.0  ----------------------------<  83  3.9   |  29.0  |  0.90    Ca    9.5      29 Nov 2017 05:57  Phos  3.9     11-29  Mg     1.7     11-29    TPro  5.8<L>  /  Alb  3.4  /  TBili  0.3<L>  /  DBili  0.1  /  AST  10  /  ALT  7   /  AlkPhos  63  11-29    PT/INR - ( 28 Nov 2017 19:45 )   PT: 10.6 sec;   INR: 0.96 ratio                LIVER FUNCTIONS - ( 29 Nov 2017 05:57 )  Alb: 3.4 g/dL / Pro: 5.8 g/dL / ALK PHOS: 63 U/L / ALT: 7 U/L / AST: 10 U/L / GGT: x               RADIOLOGY & ADDITIONAL STUDIES: HPI:  78 YOF w/ PMH of CAD s/p CABG 09/2016, Jd esophagus, GERD, HLD, HTN, paroxysmal, A-fib, bilateral cataracts  who came frome home for an "epigastric spasm sensation' that has been intermittent for the past 2 months, no related to meals or activity, non radiated, aggravated when she leans forward but not with deep inspirations, with no alleviating factors. Last night presented with SOB, radiated to her jaw but not arms. She also esophageal reflux which has worsened recently. Pt denies chest pain, weakness, nausea, vomiting, palpitations, diarrhea.    She underwent cardiac cath. No suggestion of CAD leading to this was mentioned. GI evaluation for epigastric pain. Patient had EGD performed in may 2017-some inflammation. Colonoscopy uptodate. Takes PPI once daily at home    PAST MEDICAL & SURGICAL HISTORY:  Palpitations  Vaughn esophagus  GERD (gastroesophageal reflux disease)  PVC's (premature ventricular contractions)  Paroxysmal atrial fibrillation  Hypertension  Hyperlipidemia  Status post endoscopy: 05/2017  History of open heart surgery      ROS:  No Heartburn, regurgitation, dysphagia, odynophagia.  No dyspepsia  No abdominal pain.    No Nausea, vomiting.  No Bleeding.  No hematemesis.   No diarrhea.    No hematochesia.  No weight loss, anorexia.  No edema.      MEDICATIONS  (STANDING):  aspirin  chewable 81 milliGRAM(s) Oral daily  enoxaparin Injectable 40 milliGRAM(s) SubCutaneous every 24 hours  hydrochlorothiazide 12.5 milliGRAM(s) Oral daily  lisinopril 20 milliGRAM(s) Oral daily  metoprolol     tartrate 12.5 milliGRAM(s) Oral two times a day  pantoprazole  Injectable 40 milliGRAM(s) IV Push every 12 hours  simvastatin 40 milliGRAM(s) Oral at bedtime  sodium chloride 0.9%. 1000 milliLiter(s) (100 mL/Hr) IV Continuous <Continuous>    MEDICATIONS  (PRN):  ALPRAZolam 0.25 milliGRAM(s) Oral every 12 hours PRN anxiety  aluminum hydroxide/magnesium hydroxide/simethicone Suspension 30 milliLiter(s) Oral every 4 hours PRN Dyspepsia  oxyCODONE    5 mG/acetaminophen 325 mG 1 Tablet(s) Oral every 6 hours PRN Moderate Pain (4 - 6)      Allergies    No Known Allergies    Intolerances        SOCIAL HISTORY:Denies x 3    ENDOSCOPIC/GI HISTORY:EGD/colonoscopy in the past    FAMILY HISTORY:  Family history of esophageal cancer (Mother)  Family history of hypertension (Father, Mother)  Family history of heart disease (Father)  Family history of diabetes mellitus (Father, Mother)      Vital Signs Last 24 Hrs  T(C): 36.7 (30 Nov 2017 17:17), Max: 36.8 (29 Nov 2017 22:15)  T(F): 98.1 (30 Nov 2017 17:17), Max: 98.2 (29 Nov 2017 22:15)  HR: 50 (30 Nov 2017 17:17) (50 - 68)  BP: 112/60 (30 Nov 2017 17:17) (110/60 - 148/73)  BP(mean): --  RR: 18 (30 Nov 2017 17:17) (16 - 18)  SpO2: 100% (30 Nov 2017 17:17) (88% - 100%)    PHYSICAL EXAM:    GENERAL: NAD, well-groomed, well-developed  HEAD:  Atraumatic, Normocephalic  EYES: EOMI, PERRLA, conjunctiva and sclera clear  ENMT: No tonsillar erythema, exudates, or enlargement; Moist mucous membranes, Good dentition, No lesions  NECK: Supple, No JVD, Normal thyroid  CHEST/LUNG: Clear to percussion bilaterally; No rales, rhonchi, wheezing, or rubs  HEART: Regular rate and rhythm; No murmurs, rubs, or gallops  ABDOMEN: Soft, Nontender, Nondistended; Bowel sounds present  EXTREMITIES:  2+ Peripheral Pulses, No clubbing, cyanosis, or edema  LYMPH: No lymphadenopathy noted  SKIN: No rashes or lesions      LABS:                        12.6   7.6   )-----------( 240      ( 29 Nov 2017 05:57 )             39.0     11-29    145  |  104  |  18.0  ----------------------------<  83  3.9   |  29.0  |  0.90    Ca    9.5      29 Nov 2017 05:57  Phos  3.9     11-29  Mg     1.7     11-29    TPro  5.8<L>  /  Alb  3.4  /  TBili  0.3<L>  /  DBili  0.1  /  AST  10  /  ALT  7   /  AlkPhos  63  11-29    PT/INR - ( 28 Nov 2017 19:45 )   PT: 10.6 sec;   INR: 0.96 ratio                LIVER FUNCTIONS - ( 29 Nov 2017 05:57 )  Alb: 3.4 g/dL / Pro: 5.8 g/dL / ALK PHOS: 63 U/L / ALT: 7 U/L / AST: 10 U/L / GGT: x               RADIOLOGY & ADDITIONAL STUDIES:

## 2017-11-30 NOTE — PROGRESS NOTE ADULT - PROBLEM SELECTOR PROBLEM 1
Coronary artery disease involving coronary bypass graft of native heart without angina pectoris
Epigastric pain
Epigastric pain

## 2017-11-30 NOTE — PROGRESS NOTE ADULT - PROBLEM SELECTOR PLAN 1
Problem/Plan - 1:  ·  Problem: CAD (coronary artery disease).   -Plan: Right groin care instructions   - Patient concerned as to planned cataract surgery  - Stress test and ECHO to evaluate ischemic burden of ramus. If positive will stent after eye surgery.   -compliance with diet, exercise, medications and f/u d/w pt  -Continue prior medications  FU ___Jeovanny as outpatient after Stress and Echo
h/o yaz esophagus, cont PPI increase dose to BID , US of winnie r/o gallbladder disease
h/o yaz esophagus, cont PPI increase dose to BID , US of abdomen showed cholelithiasis no sign of infection  CT of abdomen r/p pancreatitis vs mass

## 2017-11-30 NOTE — PROGRESS NOTE ADULT - ASSESSMENT
s/p diagnostic LHC via R groin site which revealed patent grafts, LIMA to the LAD and SVG to the Circ, and an 80% blockage to the LM that was present prior to CABG that leaves a large diag (Farias) not bypassed.  EF= 40-45%. EDP- 7-8.  Mynx closure at R peggy site.
79 yo femaler with CAD /CABG , HTN , Jd esophagus , paroxysmal atrial fib admitted for shortness of breath and epigastric pain
79 yo femaler with CAD /CABG , HTN , Jd esophagus , paroxysmal atrial fib admitted for shortness of breath and epigastric pain

## 2017-11-30 NOTE — PROGRESS NOTE ADULT - PROBLEM SELECTOR PLAN 2
troponinx3 negtaive, TTE , cardio evaluation pending   cont aspirin , metoprolol held due to bardycardia
troponinx3 negative, LHC with Ramus daigonal lesion   has pending eye surgery ; per intervential cardiology to get stress test then consider stent after eye surgery   cont aspirin , metoprolol held due to bardycardia

## 2017-11-30 NOTE — DIETITIAN INITIAL EVALUATION ADULT. - PROBLEM SELECTOR PLAN 5
Last Endoscopy: 05/2017 as per pt  Saw GI yesterday and was told to continue Omeprazole and Ranitidine.

## 2017-11-30 NOTE — PROGRESS NOTE ADULT - SUBJECTIVE AND OBJECTIVE BOX
DENNIS BROWN  [id number]      Chief Complaint:  Abdominal Pain    Interval History:  Patient with another episode of abdominal pain this am described as a sudden spasm a/w SOB, resolved.  No CP/palps.    Tele:  No events      ALPRAZolam 0.25 milliGRAM(s) Oral every 12 hours PRN  aluminum hydroxide/magnesium hydroxide/simethicone Suspension 30 milliLiter(s) Oral every 4 hours PRN  aspirin  chewable 81 milliGRAM(s) Oral daily  enoxaparin Injectable 40 milliGRAM(s) SubCutaneous every 24 hours  hydrochlorothiazide 12.5 milliGRAM(s) Oral daily  lisinopril 20 milliGRAM(s) Oral daily  oxyCODONE    5 mG/acetaminophen 325 mG 1 Tablet(s) Oral every 6 hours PRN  pantoprazole    Tablet 40 milliGRAM(s) Oral two times a day before meals  simvastatin 40 milliGRAM(s) Oral at bedtime  sodium chloride 0.9%. 1000 milliLiter(s) IV Continuous <Continuous>  sucralfate 1 Gram(s) Oral three times a day before meals          Physical Exam:  T(C): 36.6 (11-30-17 @ 10:30), Max: 36.8 (11-29-17 @ 22:15)  HR: 68 (11-30-17 @ 11:14) (45 - 68)  BP: 148/73 (11-30-17 @ 11:14) (110/59 - 148/73)  RR: 16 (11-30-17 @ 11:14) (16 - 18)  SpO2: 88% (11-30-17 @ 11:14) (88% - 98%)  Wt(kg): --  General: Comfortable in NAD  Neck: No JVD  CVS: nl s1s2, no s3  Pulm: CTA b/l  Abd: soft, non-tender  Ext: No c/c/e  Neuro A&O x3  Psych: Normal affect      Labs:   29 Nov 2017 05:57    145    |  104    |  18.0   ----------------------------<  83     3.9     |  29.0   |  0.90     Ca    9.5        29 Nov 2017 05:57  Phos  3.9       29 Nov 2017 05:57  Mg     1.7       29 Nov 2017 05:57    TPro  5.8    /  Alb  3.4    /  TBili  0.3    /  DBili  0.1    /  AST  10     /  ALT  7      /  AlkPhos  63     29 Nov 2017 05:57                          12.6   7.6   )-----------( 240      ( 29 Nov 2017 05:57 )             39.0     PT/INR - ( 28 Nov 2017 19:45 )   PT: 10.6 sec;   INR: 0.96 ratio           CARDIAC MARKERS ( 29 Nov 2017 05:57 )  x     / <0.01 ng/mL / 32 U/L / x     / x      CARDIAC MARKERS ( 28 Nov 2017 19:45 )  x     / <0.01 ng/mL / x     / x     / x          Echo:   1. Left ventricular ejection fraction, by visual estimation, is 60 to   65%.   2. Normal global left ventricular systolic function.   3. Spectral Doppler shows impaired relaxation pattern of left   ventricular myocardial filling (Grade I diastolic dysfunction).   4. Mildly dilated right atrium.   5. Mild mitral annular calcification.   6. Thickening of the anterior and posterior mitral valve leaflets.   7. Sclerotic aortic valve with normal opening.   8. Moderately enlarged left atrium.      Assessment:    Patient is a 78 yo F with a PMH of CAD s/p CABG, HTN, HLD, PAF presenting with exertional epigastric pain and SOB. No evidence ACS but somewhat concerning for angina given SOB and exertional component. GI work up has been unrevealing so far.   -Cath with stable dz, known RI obstruction not intervened on previously.  Unlikely as cause of pain as this has been present for a long time.  Will consider stress testing to assess significance after planned eye surgery.  -Mesenteric ischemia possible but not related to food so less likely.  -Echo unremarkable    Plan:  1. CV stable for d/c when ok per med  2. F/u CT A/P and further w/u per med/GI  3. Restart lower dose BB given viktor, no symptoms.  4. ASA/statin  5. Continue other current CV meds at current doses

## 2017-11-30 NOTE — DIETITIAN INITIAL EVALUATION ADULT. - PROBLEM SELECTOR PLAN 1
rule out ACS     Admit to hospitalist service, 4TW CTU  Activity: as tolerated  Diet: DASH  Repeat EKG in the morning  1st troponins negative, will repeat in the morning  c/w metoprolol 20mg  c/w ASA 81mg  c/w Simvastatin  cardio consulted  Will do TTE (Last TTE: 09/2016,LVEF 55-60%, G1 dyastolic dysfunction)

## 2017-11-30 NOTE — PROGRESS NOTE ADULT - PROBLEM SELECTOR PROBLEM 2
Coronary artery disease involving coronary bypass graft of native heart without angina pectoris
Coronary artery disease involving coronary bypass graft of native heart without angina pectoris

## 2017-11-30 NOTE — PROGRESS NOTE ADULT - SUBJECTIVE AND OBJECTIVE BOX
Patient is a 77y old  Female who presents with a chief complaint of Epigastric pain on off for 2 months , pain is better today she says intermittent sharp associated with difficulty to breath     cardiac monitor reviewed 44-62 range overnight asymptomatic , metoprolol on hold   [facility] result noted     HPI:  78 YOF w/ PMH of CAD s/p stentx2 ,ast one 09/2016, Jd esophagus, GERD, HLD, HTN, paroxismal, A-fib, bilateral cataracts  who came frome home for an "epigastric spasm sensation' that has been intermittent for the past 2 months, no related to meals or activity, non radiated, aggravated when she leans forward but not with deep inspirations, with no alleviating factors. Last night presented with SOB, radiated to her jaw but not arms. She also esophageal reflux which has worsened recently. Pt denies chest pain, weakness, nausea, vomiting, palpitations, diarrhea. She had seen her GI doctor yesterday and  advised her to see cardiologist     In the ED 1st troponin  were negative, EKG showed non specific t wave abnormalities but no acute st changes. On Monitor pt resented PVCs; EKG was repeated, showed bigeminy but no st or t waves changes from previous EKG (28 Nov 2017 20:23)      Allergies    No Known Allergies    Intolerances        REVIEW OF SYSTEMS:  abdominal pain , shortness of breath dizziness during abdominal pain , no nausea , denies chest pain     Vital Signs Last 24 Hrs  T(C): 36.7 (30 Nov 2017 06:03), Max: 36.8 (29 Nov 2017 22:15)  T(F): 98.1 (30 Nov 2017 06:03), Max: 98.2 (29 Nov 2017 22:15)  HR: 62 (30 Nov 2017 06:03) (45 - 62)  BP: 110/60 (30 Nov 2017 06:03) (110/59 - 164/79)  BP(mean): --  RR: 16 (30 Nov 2017 06:03) (16 - 18)  SpO2: 98% (30 Nov 2017 06:03) (93% - 98%)  PHYSICAL EXAM:    GENERAL: NAD, well-groomed, well-developed  NECK: Supple, No JVD, Normal thyroid  CHEST/LUNG: Clear to auscultation  bilaterally; No rales, rhonchi, wheezing  HEART: Regular rate and rhythm; No murmurs, rubs, or gallops  ABDOMEN: Soft, RUQ epigastric tenderness on palpation . Nondistended; Bowel sounds present  EXTREMITIES:  2+ Peripheral Pulses, No clubbing, cyanosis, or edema      LABS:                        12.6   7.6   )-----------( 240      ( 29 Nov 2017 05:57 )             39.0     11-29    145  |  104  |  18.0  ----------------------------<  83  3.9   |  29.0  |  0.90    Ca    9.5      29 Nov 2017 05:57  Phos  3.9     11-29  Mg     1.7     11-29    TPro  6.3<L>  /  Alb  3.6  /  TBili  0.3<L>  /  DBili  x   /  AST  12  /  ALT  9   /  AlkPhos  69  11-28    PT/INR - ( 28 Nov 2017 19:45 )   PT: 10.6 sec;   INR: 0.96 ratio               RADIOLOGY & ADDITIONAL TESTS:

## 2017-11-30 NOTE — DIETITIAN INITIAL EVALUATION ADULT. - PROBLEM SELECTOR PLAN 4
Paroxysmal since now is on sinus rhythm, not on anticoagulation   c/w metoprolol 20mg and aspirin 81 mg

## 2017-11-30 NOTE — PROGRESS NOTE ADULT - SUBJECTIVE AND OBJECTIVE BOX
Updates    78 YOF w/ PMH of CAD s/p stentx2 ,ast one 2016, Jd esophagus, GERD, HLD, HTN, paroxismal, A-fib, bilateral cataracts  who came frome home for an "epigastric spasm sensation' that has been intermittent for the past 2 months.  Underwent cardiac cath yesterday.      [facility]  CORONARY VESSELS: The coronary circulation is right dominant.  LM:   --  Distal left main: There was a tubular 80 % stenosis in the distal  third of the vessel segment. The lesion was moderately calcified.  LAD:   --  Mid LAD: The artery was supplied by a patent bypass graft.  Distal vessel angiography showed a medium sized vessel and no disease.  CX:   --  Proximal circumflex: There was a tubular 95 % stenosis at the  ostium of the vessel segment. The lesion was heavily calcified.  --  OM1: The vessel was medium to large sized. The artery was supplied by a  patent bypass graft that fills the proximal cX system retrogradely as  well.  RI:   --  Ramus intermedius: The vessel was medium to large sized.  Angiography showed mild atherosclerosis with no flow limiting lesions.  RCA:   --  RCA: The vessel was large sized (dominant).  --  Ostial RCA: There was a 30 % stenosis.  --  Mid RCA: There was a 30 % stenosis.  --  RPDA: The vessel was medium sized. Angiography showed no evidence of  disease.  --  RPLS: The vessel was medium sized. Angiography showed no evidence of  disease.  GRAFTS:   --  Graft to the LAD: The graft was a LIMA. Graft angiography  showed no evidence of disease.  --  Graft to the 1st obtuse marginal: The graft was a saphenous vein graft.  Graft angiography showed no evidence of disease.  COMPLICATIONS: No complications occurred during the cath lab visit.  DIAGNOSTIC IMPRESSIONS: Patent LIMA to LAD and SVG to OM1  Significant distal LM disease  Ramus branch not bypassed with significant LM disease  DIAGNOSTIC RECOMMENDATIONS: Medical management and risk stratification  after cataract surgery for evidence of ischemia in the ramus territory .  If evidence of medium/large anterolateral/lateral ischemia, consider PCI  of distal protected LM into the ramus branch.    PMH  Bradycardia  Epigastric pain  Afib  Hyperlipidemia  Vaughn esophagus  PVC's (premature ventricular contractions)  GERD (gastroesophageal reflux disease)  Hypertension  CAD (coronary artery disease)  History of open heart surgery  SHORTNESS OF BREATH      REVIEW OF SYSTEMS  General: Denies fever, chills, pain, no discomfort  Respiratory and Thorax:  Denies cough, sob, or any discomfort  Cardiovascular:  Denies chest pain, palpiations, or any discomfort	  Gastrointestinal:  Denies n/v/d, constipation, or any discomfort.  Has continue abd problems  CT results pending.    Genitourinary:  Denies frequency, burning, or pain  Musculoskeletal:  Denies joint pain, swelling, or any discomfort   Neurological:  Denies headache, dizzyness, blurred vision, numbing or tingling  Hematology  Nilesh bleeding o swelling    MEDICATIONS:  hydrochlorothiazide 12.5 milliGRAM(s) Oral daily  lisinopril 20 milliGRAM(s) Oral daily  metoprolol     tartrate 12.5 milliGRAM(s) Oral two times a day  ALPRAZolam 0.25 milliGRAM(s) Oral every 12 hours PRN  oxyCODONE    5 mG/acetaminophen 325 mG 1 Tablet(s) Oral every 6 hours PRN  aluminum hydroxide/magnesium hydroxide/simethicone Suspension 30 milliLiter(s) Oral every 4 hours PRN  pantoprazole  Injectable 40 milliGRAM(s) IV Push every 12 hours  simvastatin 40 milliGRAM(s) Oral at bedtime  aspirin  chewable 81 milliGRAM(s) Oral daily  enoxaparin Injectable 40 milliGRAM(s) SubCutaneous every 24 hours  sodium chloride 0.9%. 1000 milliLiter(s) IV Continuous <Continuous>        PHYSICAL EXAM:  T(C): 36.6 (17 @ 10:30), Max: 36.8 (17 @ 22:15)  HR: 68 (17 @ 11:14) (50 - 68)  BP: 148/73 (17 @ 11:14) (110/60 - 148/73)  RR: 16 (17 @ 11:14) (16 - 18)  SpO2: 88% (17 @ 11:14) (88% - 98%)  I&O's Summary  2017 07:01  -  2017 07:00  --------------------------------------------------------  IN: 0 mL / OUT: 200 mL / NET: -200 mL  2017 07:01  -  2017 14:30  --------------------------------------------------------  IN: 0 mL / OUT: 700 mL / NET: -700 mL  Daily Weight in k.2 (2017 10:23)  Appearance: Normal	  HEENT:   Normal oral mucosa, PERRL, EOMI	  Lymphatic: No lymphadenopathy  Cardiovascular: No edema  Respiratory: RR wnl for rate and rhythm, color pink	  Psychiatry: A & O x 3, Mood & affect appropriate  Gastrointestinal:  Soft, Non-tender, + BS	  Skin: No rashes, No ecchymoses, No cyanosis  Neurologic: Non-focal  Extremities: Normal range of motion, No clubbing, cyanosis or edema  Vascular: Peripheral pulses palpable 2+ bilaterally  Procedure Site:  Right groin, no bleeding, no pain, no bruising, no hematoma, +PP no edema.        TELEMETRY: 	                          12.6   7.6   )-----------( 240      ( 2017 05:57 )             39.0   145  |  104  |  18.0  ----------------------------<  83  3.9   |  29.0  |  0.90  Ca    9.5      2017 05:57  Phos  3.9     -  Mg     1.7       TPro  5.8<L>  /  Alb  3.4  /  TBili  0.3<L>  /  DBili  0.1  /  AST  10  /  ALT  7   /  AlkPhos  63

## 2017-11-30 NOTE — PROGRESS NOTE ADULT - PROBLEM SELECTOR PLAN 3
cont to monitor, continue to hold  metoprolol, restart low dose
cont to monitor, will hold/stop metoprolol

## 2017-12-01 ENCOUNTER — TRANSCRIPTION ENCOUNTER (OUTPATIENT)
Age: 77
End: 2017-12-01

## 2017-12-01 VITALS
DIASTOLIC BLOOD PRESSURE: 54 MMHG | RESPIRATION RATE: 18 BRPM | TEMPERATURE: 98 F | HEART RATE: 71 BPM | OXYGEN SATURATION: 95 % | SYSTOLIC BLOOD PRESSURE: 109 MMHG

## 2017-12-01 PROCEDURE — 93005 ELECTROCARDIOGRAM TRACING: CPT

## 2017-12-01 PROCEDURE — 99285 EMERGENCY DEPT VISIT HI MDM: CPT | Mod: 25

## 2017-12-01 PROCEDURE — 93459 L HRT ART/GRFT ANGIO: CPT

## 2017-12-01 PROCEDURE — 71045 X-RAY EXAM CHEST 1 VIEW: CPT

## 2017-12-01 PROCEDURE — 85610 PROTHROMBIN TIME: CPT

## 2017-12-01 PROCEDURE — 76705 ECHO EXAM OF ABDOMEN: CPT

## 2017-12-01 PROCEDURE — 80076 HEPATIC FUNCTION PANEL: CPT

## 2017-12-01 PROCEDURE — 80048 BASIC METABOLIC PNL TOTAL CA: CPT

## 2017-12-01 PROCEDURE — 36415 COLL VENOUS BLD VENIPUNCTURE: CPT

## 2017-12-01 PROCEDURE — 84100 ASSAY OF PHOSPHORUS: CPT

## 2017-12-01 PROCEDURE — 80061 LIPID PANEL: CPT

## 2017-12-01 PROCEDURE — C1769: CPT

## 2017-12-01 PROCEDURE — 82550 ASSAY OF CK (CPK): CPT

## 2017-12-01 PROCEDURE — 85027 COMPLETE CBC AUTOMATED: CPT

## 2017-12-01 PROCEDURE — 93306 TTE W/DOPPLER COMPLETE: CPT

## 2017-12-01 PROCEDURE — 83735 ASSAY OF MAGNESIUM: CPT

## 2017-12-01 PROCEDURE — C1760: CPT

## 2017-12-01 PROCEDURE — C1894: CPT

## 2017-12-01 PROCEDURE — 74176 CT ABD & PELVIS W/O CONTRAST: CPT

## 2017-12-01 PROCEDURE — 84484 ASSAY OF TROPONIN QUANT: CPT

## 2017-12-01 PROCEDURE — 99239 HOSP IP/OBS DSCHRG MGMT >30: CPT

## 2017-12-01 PROCEDURE — C1887: CPT

## 2017-12-01 PROCEDURE — 83690 ASSAY OF LIPASE: CPT

## 2017-12-01 PROCEDURE — 80053 COMPREHEN METABOLIC PANEL: CPT

## 2017-12-01 PROCEDURE — 85379 FIBRIN DEGRADATION QUANT: CPT

## 2017-12-01 RX ORDER — LISINOPRIL 2.5 MG/1
10 TABLET ORAL DAILY
Qty: 0 | Refills: 0 | Status: DISCONTINUED | OUTPATIENT
Start: 2017-12-01 | End: 2017-12-01

## 2017-12-01 RX ORDER — ASPIRIN/CALCIUM CARB/MAGNESIUM 324 MG
1 TABLET ORAL
Qty: 0 | Refills: 0 | COMMUNITY
Start: 2017-12-01

## 2017-12-01 RX ORDER — METOPROLOL TARTRATE 50 MG
0.5 TABLET ORAL
Qty: 30 | Refills: 1 | OUTPATIENT
Start: 2017-12-01 | End: 2018-01-29

## 2017-12-01 RX ORDER — PANTOPRAZOLE SODIUM 20 MG/1
1 TABLET, DELAYED RELEASE ORAL
Qty: 60 | Refills: 0 | OUTPATIENT
Start: 2017-12-01 | End: 2017-12-31

## 2017-12-01 RX ORDER — LISINOPRIL 2.5 MG/1
1 TABLET ORAL
Qty: 30 | Refills: 0 | OUTPATIENT
Start: 2017-12-01 | End: 2017-12-31

## 2017-12-01 RX ADMIN — LISINOPRIL 20 MILLIGRAM(S): 2.5 TABLET ORAL at 06:34

## 2017-12-01 RX ADMIN — PANTOPRAZOLE SODIUM 40 MILLIGRAM(S): 20 TABLET, DELAYED RELEASE ORAL at 06:34

## 2017-12-01 RX ADMIN — Medication 12.5 MILLIGRAM(S): at 06:34

## 2017-12-01 NOTE — DISCHARGE NOTE ADULT - MEDICATION SUMMARY - MEDICATIONS TO CHANGE
I will SWITCH the dose or number of times a day I take the medications listed below when I get home from the hospital:    omeprazole-sodium bicarbonate 40 mg-1100 mg oral capsule  -- 1 cap(s) by mouth once a day

## 2017-12-01 NOTE — DISCHARGE NOTE ADULT - SECONDARY DIAGNOSIS.
Coronary artery disease involving coronary bypass graft of native heart without angina pectoris Essential hypertension Gastroesophageal reflux disease without esophagitis Hyperlipidemia, unspecified hyperlipidemia type Paroxysmal atrial fibrillation

## 2017-12-01 NOTE — DISCHARGE NOTE ADULT - MEDICATION SUMMARY - MEDICATIONS TO TAKE
I will START or STAY ON the medications listed below when I get home from the hospital:    aspirin 81 mg oral tablet, chewable  -- 1 tab(s) by mouth once a day  -- Indication: For Afib    lisinopril 20 mg oral tablet  -- 1 tab(s) by mouth once a day  -- Indication: For Hypertension    simvastatin 40 mg oral tablet  -- 1 tab(s) by mouth once a day (at bedtime)  -- Indication: For Hyperlipidemia    metoprolol tartrate 25 mg oral tablet  -- 0.5 tab(s) by mouth every 12 hours   -- Indication: For Htn    hydroCHLOROthiazide 12.5 mg oral capsule  -- 1 cap(s) by mouth once a day  -- Indication: For Hypertension    ferrous sulfate  -- 325 milligram(s) by mouth once a day  -- Indication: For Anemia    pantoprazole 40 mg oral delayed release tablet  -- 1 tab(s) by mouth 2 times a day x 30 days   -- Indication: For Epigastric pain/Ulcer    ascorbic acid 500 mg oral tablet  -- 1 tab(s) by mouth once a day  -- Indication: For Supplemnet    folic acid 1 mg oral tablet  -- 1 tab(s) by mouth once a day  -- Indication: For Supplement I will START or STAY ON the medications listed below when I get home from the hospital:    aspirin 81 mg oral tablet, chewable  -- 1 tab(s) by mouth once a day  -- Indication: For Afib    lisinopril 10 mg oral tablet  -- 1 tab(s) by mouth once a day   -- Do not take this drug if you are pregnant.  It is very important that you take or use this exactly as directed.  Do not skip doses or discontinue unless directed by your doctor.  Some non-prescription drugs may aggravate your condition.  Read all labels carefully.  If a warning appears, check with your doctor before taking.    -- Indication: For Hypertension    simvastatin 40 mg oral tablet  -- 1 tab(s) by mouth once a day (at bedtime)  -- Indication: For Hyperlipidemia    metoprolol tartrate 25 mg oral tablet  -- 0.5 tab(s) by mouth every 12 hours   -- Indication: For Htn    hydroCHLOROthiazide 12.5 mg oral capsule  -- 1 cap(s) by mouth once a day  -- Indication: For Hypertension    ferrous sulfate  -- 325 milligram(s) by mouth once a day  -- Indication: For Anemia    pantoprazole 40 mg oral delayed release tablet  -- 1 tab(s) by mouth 2 times a day x 30 days   -- Indication: For Epigastric pain/Ulcer    ascorbic acid 500 mg oral tablet  -- 1 tab(s) by mouth once a day  -- Indication: For Supplemnet    folic acid 1 mg oral tablet  -- 1 tab(s) by mouth once a day  -- Indication: For Supplement

## 2017-12-01 NOTE — DISCHARGE NOTE ADULT - HOSPITAL COURSE
78 YOF w/ PMH of CAD s/p stentx2 ,ast one 09/2016, Jd esophagus, GERD, HLD, HTN, paroxismal, A-fib, bilateral cataracts  who came frome home for an "epigastric spasm sensation' that has been intermittent for the past 2 months, no related to meals or activity, non radiated, aggravated when she leans forward but not with deep inspirations, with no alleviating factors. Last night presented with SOB, radiated to her jaw but not arms. She also esophageal reflux which has worsened recently. Pt denies chest pain, weakness, nausea, vomiting, palpitations, diarrhea.  In the ED 1st troponins were negative, EKG showed non specific t wave abnormalities but no acute st changes. On Monitor pt resented PVCs; EKG was repeated, showed bigeminy but no st or t waves changes from previous EKG. She had admitted seen by cardioloy , pt denies chest pain reports epigatric pain tenderness on exam , US of abd showed cholelithiasis , LFT's wnl , had LHC graft ok, ramus lesion needs to be evaluated for ischemia by stress test for outpatient and if abnormal to consider stent to ramus . Pt continue to have epigastric pain , protonix dose increased , CT of abdomen performed   < from: CT Abdomen and Pelvis w/ Oral Cont (11.30.17 @ 11:25) >  Impression:    Cholelithiasis.    Possible thickening of the posterior bulbar duodenum; recommend further   correlation for peptic ulcer disease.    Rounded lesion isodense to renal cortex medial aspect of left kidney.  Recommend further evaluation with renal ultrasonography to exclude solid   neoplasm.    Seen by Gastroenterologist ; Agree with PPI bid and assess the response. Other option is to consider carafate 1 gram po qid  2. Can follow up with her GI physician   3. No need to perform EGD at this time  pain is improving , tolerating diet   Pt had bardycardia on monitor metoprolol dose decreased to 12.5 mg bid , tolerating .  She is discharged home to follow up with gastroenterologsit   total time spend 40 min in total .

## 2017-12-01 NOTE — PROGRESS NOTE ADULT - SUBJECTIVE AND OBJECTIVE BOX
DENNIS BROWN  [id number]        Chief Complaint: epigastric pain/CAD      Subjective: feels better, less pain      24 hour Tele: SR , no events        ALPRAZolam 0.25 milliGRAM(s) Oral every 12 hours PRN  aluminum hydroxide/magnesium hydroxide/simethicone Suspension 30 milliLiter(s) Oral every 4 hours PRN  aspirin  chewable 81 milliGRAM(s) Oral daily  enoxaparin Injectable 40 milliGRAM(s) SubCutaneous every 24 hours  hydrochlorothiazide 12.5 milliGRAM(s) Oral daily  lisinopril 10 milliGRAM(s) Oral daily  metoprolol     tartrate 12.5 milliGRAM(s) Oral two times a day  oxyCODONE    5 mG/acetaminophen 325 mG 1 Tablet(s) Oral every 6 hours PRN  pantoprazole  Injectable 40 milliGRAM(s) IV Push every 12 hours  simvastatin 40 milliGRAM(s) Oral at bedtime  sodium chloride 0.9%. 1000 milliLiter(s) IV Continuous <Continuous>          Physical Exam:  T(C): 36.6 (12-01-17 @ 06:34), Max: 37.1 (11-30-17 @ 21:40)  HR: 54 (12-01-17 @ 06:34) (50 - 68)  BP: 110/54 (12-01-17 @ 06:34) (110/54 - 148/73)  RR: 18 (12-01-17 @ 06:34) (16 - 18)  SpO2: 96% (12-01-17 @ 06:34) (88% - 100%)  Wt(kg): --  General: Comfortable in NAD  HEENT: MMM, sclera anicteric  Resp: CTA bilaterally  CVS: nl s1s2, rrr, no s3/JVD  Abd: soft, minimal TTP, ND, BS+  Ext: No c/c/e  Neuro A&O x3  Psych: Normal affect    I&O's Summary    30 Nov 2017 07:01  -  01 Dec 2017 07:00  --------------------------------------------------------  IN: 0 mL / OUT: 700 mL / NET: -700 mL    Echo:   1. Left ventricular ejection fraction, by visual estimation, is 60 to   65%.   2. Normal global left ventricular systolic function.   3. Spectral Doppler shows impaired relaxation pattern of left   ventricular myocardial filling (Grade I diastolic dysfunction).   4. Mildly dilated right atrium.   5. Mild mitral annular calcification.   6. Thickening of the anterior and posterior mitral valve leaflets.   7. Sclerotic aortic valve with normal opening.   8. Moderately enlarged left atrium.      Assessment:    Patient is a 78 yo F with a PMH of CAD s/p CABG, HTN, HLD, PAF presenting with exertional epigastric pain and SOB. No evidence ACS but somewhat concerning for angina given SOB and exertional component.   -Cath with stable dz, known RI obstruction not intervened on previously.  Unlikely as cause of pain as this has been present prior to bypass and not symptomatic then  -Will consider stress testing to assess significance after planned eye surgery if symptoms continue despite aggressive management PUD.  -Echo unremarkable    Plan:  1. CV stable for discharge  2. Continue med management CAD  3. Follow up with Dr. Hodge 1-2 weeks

## 2017-12-01 NOTE — DISCHARGE NOTE ADULT - CARE PROVIDER_API CALL
Ed Hodge), Internal Medicine  [address] [place] [address]  Phone: [phone]  Fax: [phone]

## 2017-12-01 NOTE — DISCHARGE NOTE ADULT - PLAN OF CARE
treat ulcer continue protonix, see your gastroenterologist in 1 week or sooner if pain worsens for possible Endoscopy outpatient stress test controlled cont protonix follow up with cardiologist

## 2017-12-01 NOTE — DISCHARGE NOTE ADULT - CARE PLAN
Principal Discharge DX:	Epigastric pain  Goal:	treat ulcer  Instructions for follow-up, activity and diet:	continue protonix, see your gastroenterologist in 1 week or sooner if pain worsens for possible Endoscopy  Secondary Diagnosis:	Coronary artery disease involving coronary bypass graft of native heart without angina pectoris  Instructions for follow-up, activity and diet:	outpatient stress test  Secondary Diagnosis:	Essential hypertension  Instructions for follow-up, activity and diet:	controlled  Secondary Diagnosis:	Gastroesophageal reflux disease without esophagitis  Instructions for follow-up, activity and diet:	cont protonix  Secondary Diagnosis:	Hyperlipidemia, unspecified hyperlipidemia type  Secondary Diagnosis:	Paroxysmal atrial fibrillation  Instructions for follow-up, activity and diet:	follow up with cardiologist

## 2018-01-18 ENCOUNTER — APPOINTMENT (OUTPATIENT)
Dept: CARDIOLOGY | Facility: CLINIC | Age: 78
End: 2018-01-18
Payer: COMMERCIAL

## 2018-01-18 PROCEDURE — 93000 ELECTROCARDIOGRAM COMPLETE: CPT

## 2018-01-18 PROCEDURE — 99214 OFFICE O/P EST MOD 30 MIN: CPT

## 2018-02-24 ENCOUNTER — EMERGENCY (EMERGENCY)
Facility: HOSPITAL | Age: 78
LOS: 1 days | Discharge: HOME | End: 2018-02-24
Attending: EMERGENCY MEDICINE | Admitting: EMERGENCY MEDICINE
Payer: COMMERCIAL

## 2018-02-24 VITALS
DIASTOLIC BLOOD PRESSURE: 89 MMHG | SYSTOLIC BLOOD PRESSURE: 161 MMHG | WEIGHT: 169.09 LBS | HEIGHT: 66 IN | OXYGEN SATURATION: 95 % | RESPIRATION RATE: 18 BRPM | HEART RATE: 61 BPM | TEMPERATURE: 98 F

## 2018-02-24 DIAGNOSIS — Z98.890 OTHER SPECIFIED POSTPROCEDURAL STATES: Chronic | ICD-10-CM

## 2018-02-24 DIAGNOSIS — Z98.89 OTHER SPECIFIED POSTPROCEDURAL STATES: Chronic | ICD-10-CM

## 2018-02-24 LAB
BASOPHILS # BLD AUTO: 0 K/UL — SIGNIFICANT CHANGE UP (ref 0–0.2)
BASOPHILS NFR BLD AUTO: 0.4 % — SIGNIFICANT CHANGE UP (ref 0–2)
EOSINOPHIL # BLD AUTO: 0.2 K/UL — SIGNIFICANT CHANGE UP (ref 0–0.5)
EOSINOPHIL NFR BLD AUTO: 2.2 % — SIGNIFICANT CHANGE UP (ref 0–6)
HCT VFR BLD CALC: 41.6 % — SIGNIFICANT CHANGE UP (ref 37–47)
HGB BLD-MCNC: 13.5 G/DL — SIGNIFICANT CHANGE UP (ref 12–16)
LACTATE BLDV-MCNC: 1.3 MMOL/L — SIGNIFICANT CHANGE UP (ref 0.5–2)
LYMPHOCYTES # BLD AUTO: 1.4 K/UL — SIGNIFICANT CHANGE UP (ref 1–4.8)
LYMPHOCYTES # BLD AUTO: 19 % — LOW (ref 20–55)
MCHC RBC-ENTMCNC: 30.3 PG — SIGNIFICANT CHANGE UP (ref 27–31)
MCHC RBC-ENTMCNC: 32.5 G/DL — SIGNIFICANT CHANGE UP (ref 32–36)
MCV RBC AUTO: 93.3 FL — SIGNIFICANT CHANGE UP (ref 81–99)
MONOCYTES # BLD AUTO: 0.5 K/UL — SIGNIFICANT CHANGE UP (ref 0–0.8)
MONOCYTES NFR BLD AUTO: 6.6 % — SIGNIFICANT CHANGE UP (ref 3–10)
NEUTROPHILS # BLD AUTO: 5.1 K/UL — SIGNIFICANT CHANGE UP (ref 1.8–8)
NEUTROPHILS NFR BLD AUTO: 71.7 % — SIGNIFICANT CHANGE UP (ref 37–73)
PLATELET # BLD AUTO: 216 K/UL — SIGNIFICANT CHANGE UP (ref 150–400)
RBC # BLD: 4.46 M/UL — SIGNIFICANT CHANGE UP (ref 4.4–5.2)
RBC # FLD: 12.8 % — SIGNIFICANT CHANGE UP (ref 11–15.6)
WBC # BLD: 7.2 K/UL — SIGNIFICANT CHANGE UP (ref 4.8–10.8)
WBC # FLD AUTO: 7.2 K/UL — SIGNIFICANT CHANGE UP (ref 4.8–10.8)

## 2018-02-24 PROCEDURE — 99285 EMERGENCY DEPT VISIT HI MDM: CPT | Mod: 25

## 2018-02-24 PROCEDURE — 93010 ELECTROCARDIOGRAM REPORT: CPT

## 2018-02-24 RX ORDER — METOCLOPRAMIDE HCL 10 MG
10 TABLET ORAL ONCE
Qty: 0 | Refills: 0 | Status: COMPLETED | OUTPATIENT
Start: 2018-02-24 | End: 2018-02-24

## 2018-02-24 RX ORDER — FAMOTIDINE 10 MG/ML
20 INJECTION INTRAVENOUS ONCE
Qty: 0 | Refills: 0 | Status: COMPLETED | OUTPATIENT
Start: 2018-02-24 | End: 2018-02-24

## 2018-02-24 RX ADMIN — Medication 10 MILLIGRAM(S): at 23:42

## 2018-02-24 RX ADMIN — FAMOTIDINE 20 MILLIGRAM(S): 10 INJECTION INTRAVENOUS at 23:42

## 2018-02-24 NOTE — ED PROVIDER NOTE - NS ED ROS FT
Const: Denies fever, chills  HEENT: Denies blurry vision, sore throat  Neck: Denies neck pain/stiffness  Resp: Denies coughing, SOB  Cardiovascular: Denies CP, palpitations, LE edema  GI: + nausea, + vomiting, + abdominal pain. Denies diarrhea, constipation, blood in stool  : Denies urinary frequency/urgency/dysuria, hematuria  MSK: Denies back pain  Neuro: Denies HA, dizziness, numbness, weakness  Skin: Denies rashes.

## 2018-02-24 NOTE — ED ADULT NURSE NOTE - OBJECTIVE STATEMENT
Assumed pt care @ 2310. Pt received sitting on stretcher in NAD. Pt AOx3 C/O 10/10 mid epigastric pain. Pt reports its stabbing but is not always constant. Pt has a h/o a ulcer. Pt said on Wednesday she vomited and pain has been on and off since there. Pt reports cat scans and ultrasounds recently done in past. Lungs CTA, RR even unlabored. Abd soft & tender, + bowel sounds x 4quads. Denies Diarrhea. Skin warm, dry, color appropriate for age and race. Assumed pt care @ 2310. Pt received sitting on stretcher in NAD. Pt AOx3 C/O 10/10 mid epigastric pain. Pt reports its stabbing but is not always constant. Pt has a h/o a ulcer. Pt said on Wednesday she vomited and pain has been on and off since there. Pt reports cat scans and ultrasounds recently done in past. Lungs CTA, RR even unlabored. Abd soft & tender, + bowel sounds x 4quads. Denies Diarrhea. Skin warm, dry, color appropriate for age and race, +1 pitting edema B/L.

## 2018-02-24 NOTE — ED PROVIDER NOTE - PHYSICAL EXAMINATION
Const: Awake, alert and oriented. In no acute distress. Well appearing.  HEENT: NC/AT. Moist mucous membranes.  Eyes: No scleral icterus. EOMI.  Neck:. Soft and supple. Full ROM without pain.  Cardiac: Regular rate and rhythm. +S1/S2. No murmurs. Peripheral pulses 2+ and symmetric. No LE edema.  Resp: Speaking in full sentences. No evidence of respiratory distress. No wheezes, rales or rhonchi.  Abd: Soft, non-distended. + Epigastric tenderness to deep palpation. Normal bowel sounds in all 4 quadrants. No guarding or rebound.  Back: Spine midline and non-tender. No CVAT.  Skin: No rashes, abrasions or lacerations.  Lymph: No cervical lymphadenopathy.

## 2018-02-24 NOTE — ED PROVIDER NOTE - NS ED MD EM SELECTION
[id number] [facility]:[LAST:[[facility]],FIRST:[Clinic],PHONE:[(   )    -],FAX:[(   )    -],ADDRESS:[[address] [place] [address]  Phone: [phone]  Fax: [phone]]]

## 2018-02-24 NOTE — ED PROVIDER NOTE - CHPI ED SYMPTOMS NEG
no fever/no chills/no hematuria/no dysuria/no abdominal distension/no diarrhea/no burning urination/no blood in stool

## 2018-02-24 NOTE — ED PROVIDER NOTE - OBJECTIVE STATEMENT
Patient with PMH Vaughn's esophagus, GERD, gastritis with ulcer, HTN, HLD and paroxysmal A fib presents complaining of constant, persistent epigastric abdominal pain which is worse s/p eating. She was hospitalized for this same pain in December 2017 where she underwent a cardiac cath and endoscopy/colonoscopy and was started on omeprazole for her gastritis with ulcer. She notes that she was in [place] for a month since her discharge and her pain persisted. She also notes one episode of vomiting. The pain radiates from her epigastrium to her back and causes SOB. She denies fevers, CP, HA, dizziness, coughing, sore throat, diarrhea, constipation, blood in stool or LE edema. She denies allergies to medications, denies smoking, EtOH or illicit drugs.

## 2018-02-24 NOTE — ED PROVIDER NOTE - MEDICAL DECISION MAKING DETAILS
Patient presents for epigastric pain radiating to her back which has been present for months, for which she was hospitalized during Dec 2017, which continues to persist despite omeprazole. Will check labs, give pepcid and reglan and consider CT angio of the abdomen/pelvis to rule out mesenteric ischemia as patient has had a very thorough work up otherwise with multiple CT's with IV contrast and US's and a cardiac cath without a clear etiology.

## 2018-02-25 VITALS
TEMPERATURE: 98 F | RESPIRATION RATE: 18 BRPM | OXYGEN SATURATION: 94 % | DIASTOLIC BLOOD PRESSURE: 96 MMHG | HEART RATE: 55 BPM | SYSTOLIC BLOOD PRESSURE: 153 MMHG

## 2018-02-25 LAB
ALBUMIN SERPL ELPH-MCNC: 3.9 G/DL — SIGNIFICANT CHANGE UP (ref 3.3–5.2)
ALP SERPL-CCNC: 86 U/L — SIGNIFICANT CHANGE UP (ref 40–120)
ALT FLD-CCNC: 17 U/L — SIGNIFICANT CHANGE UP
ANION GAP SERPL CALC-SCNC: 14 MMOL/L — SIGNIFICANT CHANGE UP (ref 5–17)
APPEARANCE UR: CLEAR — SIGNIFICANT CHANGE UP
AST SERPL-CCNC: 26 U/L — SIGNIFICANT CHANGE UP
BACTERIA # UR AUTO: NEGATIVE — SIGNIFICANT CHANGE UP
BILIRUB SERPL-MCNC: 0.2 MG/DL — LOW (ref 0.4–2)
BILIRUB UR-MCNC: NEGATIVE — SIGNIFICANT CHANGE UP
BUN SERPL-MCNC: 18 MG/DL — SIGNIFICANT CHANGE UP (ref 8–20)
CALCIUM SERPL-MCNC: 9.4 MG/DL — SIGNIFICANT CHANGE UP (ref 8.6–10.2)
CHLORIDE SERPL-SCNC: 102 MMOL/L — SIGNIFICANT CHANGE UP (ref 98–107)
CO2 SERPL-SCNC: 25 MMOL/L — SIGNIFICANT CHANGE UP (ref 22–29)
COLOR SPEC: YELLOW — SIGNIFICANT CHANGE UP
CREAT SERPL-MCNC: 0.73 MG/DL — SIGNIFICANT CHANGE UP (ref 0.5–1.3)
DIFF PNL FLD: NEGATIVE — SIGNIFICANT CHANGE UP
EPI CELLS # UR: SIGNIFICANT CHANGE UP
GLUCOSE SERPL-MCNC: 144 MG/DL — HIGH (ref 70–115)
GLUCOSE UR QL: NEGATIVE MG/DL — SIGNIFICANT CHANGE UP
KETONES UR-MCNC: NEGATIVE — SIGNIFICANT CHANGE UP
LEUKOCYTE ESTERASE UR-ACNC: ABNORMAL
NITRITE UR-MCNC: NEGATIVE — SIGNIFICANT CHANGE UP
PH UR: 6 — SIGNIFICANT CHANGE UP (ref 5–8)
POTASSIUM SERPL-MCNC: 3 MMOL/L — LOW (ref 3.5–5.3)
POTASSIUM SERPL-SCNC: 3 MMOL/L — LOW (ref 3.5–5.3)
PROT SERPL-MCNC: 6.5 G/DL — LOW (ref 6.6–8.7)
PROT UR-MCNC: NEGATIVE MG/DL — SIGNIFICANT CHANGE UP
RBC CASTS # UR COMP ASSIST: SIGNIFICANT CHANGE UP /HPF (ref 0–4)
SODIUM SERPL-SCNC: 141 MMOL/L — SIGNIFICANT CHANGE UP (ref 135–145)
SP GR SPEC: 1.01 — SIGNIFICANT CHANGE UP (ref 1.01–1.02)
TROPONIN T SERPL-MCNC: <0.01 NG/ML — SIGNIFICANT CHANGE UP (ref 0–0.06)
UROBILINOGEN FLD QL: 1 MG/DL
WBC UR QL: SIGNIFICANT CHANGE UP

## 2018-02-25 PROCEDURE — 83605 ASSAY OF LACTIC ACID: CPT

## 2018-02-25 PROCEDURE — 74174 CTA ABD&PLVS W/CONTRAST: CPT

## 2018-02-25 PROCEDURE — 96374 THER/PROPH/DIAG INJ IV PUSH: CPT | Mod: XU

## 2018-02-25 PROCEDURE — 93005 ELECTROCARDIOGRAM TRACING: CPT

## 2018-02-25 PROCEDURE — 81001 URINALYSIS AUTO W/SCOPE: CPT

## 2018-02-25 PROCEDURE — 74174 CTA ABD&PLVS W/CONTRAST: CPT | Mod: 26

## 2018-02-25 PROCEDURE — 36415 COLL VENOUS BLD VENIPUNCTURE: CPT

## 2018-02-25 PROCEDURE — 99284 EMERGENCY DEPT VISIT MOD MDM: CPT | Mod: 25

## 2018-02-25 PROCEDURE — 80053 COMPREHEN METABOLIC PANEL: CPT

## 2018-02-25 PROCEDURE — 84484 ASSAY OF TROPONIN QUANT: CPT

## 2018-02-25 PROCEDURE — 96375 TX/PRO/DX INJ NEW DRUG ADDON: CPT

## 2018-02-25 PROCEDURE — 85027 COMPLETE CBC AUTOMATED: CPT

## 2018-02-25 NOTE — ED ADULT NURSE REASSESSMENT NOTE - NS ED NURSE REASSESS COMMENT FT1
Pt reports pain 1/10 and is feeling better. Pt pending CTA of abd. Pt and family aware of the plan of care. will continue to monitor pt.

## 2018-03-07 ENCOUNTER — RX RENEWAL (OUTPATIENT)
Age: 78
End: 2018-03-07

## 2018-03-19 ENCOUNTER — APPOINTMENT (OUTPATIENT)
Dept: CARDIOLOGY | Facility: CLINIC | Age: 78
End: 2018-03-19
Payer: COMMERCIAL

## 2018-03-19 VITALS
WEIGHT: 161.56 LBS | BODY MASS INDEX: 25.96 KG/M2 | SYSTOLIC BLOOD PRESSURE: 124 MMHG | HEIGHT: 66 IN | DIASTOLIC BLOOD PRESSURE: 80 MMHG | RESPIRATION RATE: 14 BRPM | HEART RATE: 54 BPM

## 2018-03-19 DIAGNOSIS — K21.9 GASTRO-ESOPHAGEAL REFLUX DISEASE W/OUT ESOPHAGITIS: ICD-10-CM

## 2018-03-19 DIAGNOSIS — Z00.00 ENCOUNTER FOR GENERAL ADULT MEDICAL EXAMINATION W/OUT ABNORMAL FINDINGS: ICD-10-CM

## 2018-03-19 DIAGNOSIS — K22.70 BARRETT'S ESOPHAGUS W/OUT DYSPLASIA: ICD-10-CM

## 2018-03-19 PROCEDURE — 93000 ELECTROCARDIOGRAM COMPLETE: CPT

## 2018-03-19 PROCEDURE — 99214 OFFICE O/P EST MOD 30 MIN: CPT

## 2018-03-19 RX ORDER — ASPIRIN ENTERIC COATED TABLETS 81 MG 81 MG/1
81 TABLET, DELAYED RELEASE ORAL
Refills: 0 | Status: ACTIVE | COMMUNITY

## 2018-05-11 ENCOUNTER — INPATIENT (INPATIENT)
Facility: HOSPITAL | Age: 78
LOS: 5 days | Discharge: HOME | DRG: 418 | End: 2018-05-17
Attending: INTERNAL MEDICINE | Admitting: HOSPITALIST
Payer: COMMERCIAL

## 2018-05-11 VITALS — WEIGHT: 158.95 LBS | HEIGHT: 66 IN

## 2018-05-11 DIAGNOSIS — Z98.890 OTHER SPECIFIED POSTPROCEDURAL STATES: Chronic | ICD-10-CM

## 2018-05-11 DIAGNOSIS — Z98.89 OTHER SPECIFIED POSTPROCEDURAL STATES: Chronic | ICD-10-CM

## 2018-05-11 LAB
ALBUMIN SERPL ELPH-MCNC: 3.7 G/DL — SIGNIFICANT CHANGE UP (ref 3.3–5.2)
ALP SERPL-CCNC: 287 U/L — HIGH (ref 40–120)
ALT FLD-CCNC: 169 U/L — HIGH
ANION GAP SERPL CALC-SCNC: 16 MMOL/L — SIGNIFICANT CHANGE UP (ref 5–17)
APPEARANCE UR: CLEAR — SIGNIFICANT CHANGE UP
AST SERPL-CCNC: 188 U/L — HIGH
BACTERIA # UR AUTO: ABNORMAL
BILIRUB SERPL-MCNC: 1 MG/DL — SIGNIFICANT CHANGE UP (ref 0.4–2)
BILIRUB UR-MCNC: NEGATIVE — SIGNIFICANT CHANGE UP
BUN SERPL-MCNC: 18 MG/DL — SIGNIFICANT CHANGE UP (ref 8–20)
CALCIUM SERPL-MCNC: 9.5 MG/DL — SIGNIFICANT CHANGE UP (ref 8.6–10.2)
CHLORIDE SERPL-SCNC: 99 MMOL/L — SIGNIFICANT CHANGE UP (ref 98–107)
CO2 SERPL-SCNC: 27 MMOL/L — SIGNIFICANT CHANGE UP (ref 22–29)
COLOR SPEC: YELLOW — SIGNIFICANT CHANGE UP
CREAT SERPL-MCNC: 0.98 MG/DL — SIGNIFICANT CHANGE UP (ref 0.5–1.3)
DIFF PNL FLD: ABNORMAL
EPI CELLS # UR: SIGNIFICANT CHANGE UP
GLUCOSE SERPL-MCNC: 122 MG/DL — HIGH (ref 70–115)
GLUCOSE UR QL: NEGATIVE MG/DL — SIGNIFICANT CHANGE UP
HCT VFR BLD CALC: 39.3 % — SIGNIFICANT CHANGE UP (ref 37–47)
HGB BLD-MCNC: 12.6 G/DL — SIGNIFICANT CHANGE UP (ref 12–16)
KETONES UR-MCNC: ABNORMAL
LEUKOCYTE ESTERASE UR-ACNC: ABNORMAL
LIDOCAIN IGE QN: 1328 U/L — HIGH (ref 22–51)
MCHC RBC-ENTMCNC: 29.8 PG — SIGNIFICANT CHANGE UP (ref 27–31)
MCHC RBC-ENTMCNC: 32.1 G/DL — SIGNIFICANT CHANGE UP (ref 32–36)
MCV RBC AUTO: 92.9 FL — SIGNIFICANT CHANGE UP (ref 81–99)
NITRITE UR-MCNC: NEGATIVE — SIGNIFICANT CHANGE UP
PH UR: 6 — SIGNIFICANT CHANGE UP (ref 5–8)
PLATELET # BLD AUTO: 297 K/UL — SIGNIFICANT CHANGE UP (ref 150–400)
POTASSIUM SERPL-MCNC: 3.6 MMOL/L — SIGNIFICANT CHANGE UP (ref 3.5–5.3)
POTASSIUM SERPL-SCNC: 3.6 MMOL/L — SIGNIFICANT CHANGE UP (ref 3.5–5.3)
PROT SERPL-MCNC: 6.6 G/DL — SIGNIFICANT CHANGE UP (ref 6.6–8.7)
PROT UR-MCNC: 30 MG/DL
RBC # BLD: 4.23 M/UL — LOW (ref 4.4–5.2)
RBC # FLD: 13.4 % — SIGNIFICANT CHANGE UP (ref 11–15.6)
RBC CASTS # UR COMP ASSIST: ABNORMAL /HPF (ref 0–4)
SODIUM SERPL-SCNC: 142 MMOL/L — SIGNIFICANT CHANGE UP (ref 135–145)
SP GR SPEC: 1.02 — SIGNIFICANT CHANGE UP (ref 1.01–1.02)
UROBILINOGEN FLD QL: 8 MG/DL
WBC # BLD: 15.2 K/UL — HIGH (ref 4.8–10.8)
WBC # FLD AUTO: 15.2 K/UL — HIGH (ref 4.8–10.8)
WBC UR QL: ABNORMAL

## 2018-05-11 PROCEDURE — 99285 EMERGENCY DEPT VISIT HI MDM: CPT

## 2018-05-11 PROCEDURE — 99223 1ST HOSP IP/OBS HIGH 75: CPT | Mod: GC

## 2018-05-11 PROCEDURE — 74177 CT ABD & PELVIS W/CONTRAST: CPT | Mod: 26

## 2018-05-11 RX ORDER — SODIUM CHLORIDE 9 MG/ML
1000 INJECTION INTRAMUSCULAR; INTRAVENOUS; SUBCUTANEOUS
Qty: 0 | Refills: 0 | Status: DISCONTINUED | OUTPATIENT
Start: 2018-05-11 | End: 2018-05-12

## 2018-05-11 RX ORDER — PANTOPRAZOLE SODIUM 20 MG/1
40 TABLET, DELAYED RELEASE ORAL ONCE
Qty: 0 | Refills: 0 | Status: COMPLETED | OUTPATIENT
Start: 2018-05-11 | End: 2018-05-11

## 2018-05-11 RX ORDER — ACETAMINOPHEN 500 MG
975 TABLET ORAL ONCE
Qty: 0 | Refills: 0 | Status: COMPLETED | OUTPATIENT
Start: 2018-05-11 | End: 2018-05-11

## 2018-05-11 RX ORDER — METOCLOPRAMIDE HCL 10 MG
10 TABLET ORAL ONCE
Qty: 0 | Refills: 0 | Status: COMPLETED | OUTPATIENT
Start: 2018-05-11 | End: 2018-05-11

## 2018-05-11 RX ORDER — SODIUM CHLORIDE 9 MG/ML
3 INJECTION INTRAMUSCULAR; INTRAVENOUS; SUBCUTANEOUS ONCE
Qty: 0 | Refills: 0 | Status: COMPLETED | OUTPATIENT
Start: 2018-05-11 | End: 2018-05-11

## 2018-05-11 RX ORDER — HYDROMORPHONE HYDROCHLORIDE 2 MG/ML
1 INJECTION INTRAMUSCULAR; INTRAVENOUS; SUBCUTANEOUS ONCE
Qty: 0 | Refills: 0 | Status: DISCONTINUED | OUTPATIENT
Start: 2018-05-11 | End: 2018-05-11

## 2018-05-11 RX ADMIN — PANTOPRAZOLE SODIUM 40 MILLIGRAM(S): 20 TABLET, DELAYED RELEASE ORAL at 16:16

## 2018-05-11 RX ADMIN — Medication 10 MILLIGRAM(S): at 16:16

## 2018-05-11 RX ADMIN — HYDROMORPHONE HYDROCHLORIDE 1 MILLIGRAM(S): 2 INJECTION INTRAMUSCULAR; INTRAVENOUS; SUBCUTANEOUS at 22:14

## 2018-05-11 RX ADMIN — Medication 975 MILLIGRAM(S): at 17:09

## 2018-05-11 RX ADMIN — Medication 975 MILLIGRAM(S): at 22:14

## 2018-05-11 RX ADMIN — SODIUM CHLORIDE 150 MILLILITER(S): 9 INJECTION INTRAMUSCULAR; INTRAVENOUS; SUBCUTANEOUS at 21:29

## 2018-05-11 RX ADMIN — SODIUM CHLORIDE 150 MILLILITER(S): 9 INJECTION INTRAMUSCULAR; INTRAVENOUS; SUBCUTANEOUS at 16:16

## 2018-05-11 NOTE — ED ADULT NURSE NOTE - OBJECTIVE STATEMENT
pt c/o abd pain with nausea and vomiting, reports has gastritis and has been seen bypmd, pt reports her meds are not working,

## 2018-05-11 NOTE — ED PROVIDER NOTE - OBJECTIVE STATEMENT
USOH until last night when s/p meal developed LUQ pain, similar pain in past, has a GI doctor, last seen 3 weeks ago, f/u in 5 days, endoscopy performed 2 wks ago = gastritis, last bm x 2 days ago, pain is mostly continuous and radiates to upper quadrants bilaterally, assoc chills but NO: fever, distension, chest pain, palpitations, urinary symptoms, diarrhea.  Describes nausea, vomiting, and po intolerance.

## 2018-05-11 NOTE — ED PROVIDER NOTE - MEDICAL DECISION MAKING DETAILS
abdominal pain, followed by GI, endoscopy x 2 weeks ago, onset s/p meal last night, assoc N/V, constipated; line, labs with lipase for pancreatitis, urine for UTI, CT for perf/gastritis/obstruction; treat symptomatically, hydrate aggressively; check results, reassess

## 2018-05-11 NOTE — ED PROVIDER NOTE - PMH
Anxiety    Vaughn esophagus    GERD (gastroesophageal reflux disease)    Hyperlipidemia    Hypertension    Palpitations    Paroxysmal atrial fibrillation    PVC's (premature ventricular contractions)

## 2018-05-11 NOTE — ED PROVIDER NOTE - CARE PLAN
Principal Discharge DX:	Acute pancreatitis, unspecified complication status, unspecified pancreatitis type Principal Discharge DX:	Acute pancreatitis, unspecified complication status, unspecified pancreatitis type  Secondary Diagnosis:	Pancreatic mass

## 2018-05-12 DIAGNOSIS — N28.89 OTHER SPECIFIED DISORDERS OF KIDNEY AND URETER: ICD-10-CM

## 2018-05-12 DIAGNOSIS — K29.70 GASTRITIS, UNSPECIFIED, WITHOUT BLEEDING: ICD-10-CM

## 2018-05-12 DIAGNOSIS — K85.90 ACUTE PANCREATITIS WITHOUT NECROSIS OR INFECTION, UNSPECIFIED: ICD-10-CM

## 2018-05-12 DIAGNOSIS — Z98.890 OTHER SPECIFIED POSTPROCEDURAL STATES: Chronic | ICD-10-CM

## 2018-05-12 DIAGNOSIS — I10 ESSENTIAL (PRIMARY) HYPERTENSION: ICD-10-CM

## 2018-05-12 DIAGNOSIS — Z29.9 ENCOUNTER FOR PROPHYLACTIC MEASURES, UNSPECIFIED: ICD-10-CM

## 2018-05-12 DIAGNOSIS — R50.9 FEVER, UNSPECIFIED: ICD-10-CM

## 2018-05-12 DIAGNOSIS — E78.5 HYPERLIPIDEMIA, UNSPECIFIED: ICD-10-CM

## 2018-05-12 DIAGNOSIS — K86.9 DISEASE OF PANCREAS, UNSPECIFIED: ICD-10-CM

## 2018-05-12 LAB
ALBUMIN SERPL ELPH-MCNC: 3 G/DL — LOW (ref 3.3–5.2)
ALP SERPL-CCNC: 252 U/L — HIGH (ref 40–120)
ALT FLD-CCNC: 247 U/L — HIGH
ANION GAP SERPL CALC-SCNC: 12 MMOL/L — SIGNIFICANT CHANGE UP (ref 5–17)
AST SERPL-CCNC: 215 U/L — HIGH
BASOPHILS # BLD AUTO: 0 K/UL — SIGNIFICANT CHANGE UP (ref 0–0.2)
BASOPHILS NFR BLD AUTO: 0.2 % — SIGNIFICANT CHANGE UP (ref 0–2)
BILIRUB SERPL-MCNC: 0.7 MG/DL — SIGNIFICANT CHANGE UP (ref 0.4–2)
BUN SERPL-MCNC: 16 MG/DL — SIGNIFICANT CHANGE UP (ref 8–20)
CALCIUM SERPL-MCNC: 8.3 MG/DL — LOW (ref 8.6–10.2)
CHLORIDE SERPL-SCNC: 103 MMOL/L — SIGNIFICANT CHANGE UP (ref 98–107)
CO2 SERPL-SCNC: 27 MMOL/L — SIGNIFICANT CHANGE UP (ref 22–29)
CREAT SERPL-MCNC: 0.89 MG/DL — SIGNIFICANT CHANGE UP (ref 0.5–1.3)
EOSINOPHIL # BLD AUTO: 0.1 K/UL — SIGNIFICANT CHANGE UP (ref 0–0.5)
EOSINOPHIL NFR BLD AUTO: 0.7 % — SIGNIFICANT CHANGE UP (ref 0–6)
GLUCOSE SERPL-MCNC: 98 MG/DL — SIGNIFICANT CHANGE UP (ref 70–115)
HCT VFR BLD CALC: 33.4 % — LOW (ref 37–47)
HGB BLD-MCNC: 10.6 G/DL — LOW (ref 12–16)
LIDOCAIN IGE QN: 479 U/L — HIGH (ref 22–51)
LYMPHOCYTES # BLD AUTO: 0.6 K/UL — LOW (ref 1–4.8)
LYMPHOCYTES # BLD AUTO: 5.4 % — LOW (ref 20–55)
MCHC RBC-ENTMCNC: 30.1 PG — SIGNIFICANT CHANGE UP (ref 27–31)
MCHC RBC-ENTMCNC: 31.7 G/DL — LOW (ref 32–36)
MCV RBC AUTO: 94.9 FL — SIGNIFICANT CHANGE UP (ref 81–99)
MONOCYTES # BLD AUTO: 0.5 K/UL — SIGNIFICANT CHANGE UP (ref 0–0.8)
MONOCYTES NFR BLD AUTO: 4.7 % — SIGNIFICANT CHANGE UP (ref 3–10)
NEUTROPHILS # BLD AUTO: 9.2 K/UL — HIGH (ref 1.8–8)
NEUTROPHILS NFR BLD AUTO: 88.7 % — HIGH (ref 37–73)
PLATELET # BLD AUTO: 219 K/UL — SIGNIFICANT CHANGE UP (ref 150–400)
POTASSIUM SERPL-MCNC: 3.3 MMOL/L — LOW (ref 3.5–5.3)
POTASSIUM SERPL-SCNC: 3.3 MMOL/L — LOW (ref 3.5–5.3)
PROCALCITONIN SERPL-MCNC: 0.61 NG/ML — HIGH (ref 0–0.04)
PROT SERPL-MCNC: 5.5 G/DL — LOW (ref 6.6–8.7)
RBC # BLD: 3.52 M/UL — LOW (ref 4.4–5.2)
RBC # FLD: 13.7 % — SIGNIFICANT CHANGE UP (ref 11–15.6)
SODIUM SERPL-SCNC: 142 MMOL/L — SIGNIFICANT CHANGE UP (ref 135–145)
WBC # BLD: 10.4 K/UL — SIGNIFICANT CHANGE UP (ref 4.8–10.8)
WBC # FLD AUTO: 10.4 K/UL — SIGNIFICANT CHANGE UP (ref 4.8–10.8)

## 2018-05-12 PROCEDURE — 99233 SBSQ HOSP IP/OBS HIGH 50: CPT | Mod: GC

## 2018-05-12 PROCEDURE — 76705 ECHO EXAM OF ABDOMEN: CPT | Mod: 26

## 2018-05-12 PROCEDURE — 99254 IP/OBS CNSLTJ NEW/EST MOD 60: CPT

## 2018-05-12 PROCEDURE — 93010 ELECTROCARDIOGRAM REPORT: CPT

## 2018-05-12 RX ORDER — ACETAMINOPHEN 500 MG
650 TABLET ORAL EVERY 6 HOURS
Qty: 0 | Refills: 0 | Status: DISCONTINUED | OUTPATIENT
Start: 2018-05-12 | End: 2018-05-15

## 2018-05-12 RX ORDER — ERTAPENEM SODIUM 1 G/1
INJECTION, POWDER, LYOPHILIZED, FOR SOLUTION INTRAMUSCULAR; INTRAVENOUS
Qty: 0 | Refills: 0 | Status: DISCONTINUED | OUTPATIENT
Start: 2018-05-12 | End: 2018-05-15

## 2018-05-12 RX ORDER — PANTOPRAZOLE SODIUM 20 MG/1
40 TABLET, DELAYED RELEASE ORAL
Qty: 0 | Refills: 0 | Status: DISCONTINUED | OUTPATIENT
Start: 2018-05-12 | End: 2018-05-15

## 2018-05-12 RX ORDER — ERTAPENEM SODIUM 1 G/1
1000 INJECTION, POWDER, LYOPHILIZED, FOR SOLUTION INTRAMUSCULAR; INTRAVENOUS ONCE
Qty: 0 | Refills: 0 | Status: COMPLETED | OUTPATIENT
Start: 2018-05-12 | End: 2018-05-12

## 2018-05-12 RX ORDER — PANTOPRAZOLE SODIUM 20 MG/1
40 TABLET, DELAYED RELEASE ORAL
Qty: 0 | Refills: 0 | Status: DISCONTINUED | OUTPATIENT
Start: 2018-05-12 | End: 2018-05-12

## 2018-05-12 RX ORDER — OXYCODONE HYDROCHLORIDE 5 MG/1
5 TABLET ORAL EVERY 6 HOURS
Qty: 0 | Refills: 0 | Status: DISCONTINUED | OUTPATIENT
Start: 2018-05-12 | End: 2018-05-15

## 2018-05-12 RX ORDER — ERTAPENEM SODIUM 1 G/1
1000 INJECTION, POWDER, LYOPHILIZED, FOR SOLUTION INTRAMUSCULAR; INTRAVENOUS EVERY 24 HOURS
Qty: 0 | Refills: 0 | Status: DISCONTINUED | OUTPATIENT
Start: 2018-05-13 | End: 2018-05-15

## 2018-05-12 RX ORDER — SODIUM CHLORIDE 9 MG/ML
1000 INJECTION, SOLUTION INTRAVENOUS
Qty: 0 | Refills: 0 | Status: DISCONTINUED | OUTPATIENT
Start: 2018-05-12 | End: 2018-05-13

## 2018-05-12 RX ORDER — SACCHAROMYCES BOULARDII 250 MG
250 POWDER IN PACKET (EA) ORAL
Qty: 0 | Refills: 0 | Status: DISCONTINUED | OUTPATIENT
Start: 2018-05-12 | End: 2018-05-15

## 2018-05-12 RX ADMIN — PANTOPRAZOLE SODIUM 40 MILLIGRAM(S): 20 TABLET, DELAYED RELEASE ORAL at 18:20

## 2018-05-12 RX ADMIN — SODIUM CHLORIDE 3 MILLILITER(S): 9 INJECTION INTRAMUSCULAR; INTRAVENOUS; SUBCUTANEOUS at 00:42

## 2018-05-12 RX ADMIN — Medication 650 MILLIGRAM(S): at 23:40

## 2018-05-12 RX ADMIN — SODIUM CHLORIDE 200 MILLILITER(S): 9 INJECTION, SOLUTION INTRAVENOUS at 14:53

## 2018-05-12 RX ADMIN — PANTOPRAZOLE SODIUM 40 MILLIGRAM(S): 20 TABLET, DELAYED RELEASE ORAL at 11:49

## 2018-05-12 RX ADMIN — OXYCODONE HYDROCHLORIDE 5 MILLIGRAM(S): 5 TABLET ORAL at 22:43

## 2018-05-12 RX ADMIN — SODIUM CHLORIDE 200 MILLILITER(S): 9 INJECTION, SOLUTION INTRAVENOUS at 19:57

## 2018-05-12 RX ADMIN — OXYCODONE HYDROCHLORIDE 5 MILLIGRAM(S): 5 TABLET ORAL at 23:40

## 2018-05-12 RX ADMIN — ERTAPENEM SODIUM 120 MILLIGRAM(S): 1 INJECTION, POWDER, LYOPHILIZED, FOR SOLUTION INTRAMUSCULAR; INTRAVENOUS at 10:00

## 2018-05-12 RX ADMIN — OXYCODONE HYDROCHLORIDE 5 MILLIGRAM(S): 5 TABLET ORAL at 14:53

## 2018-05-12 RX ADMIN — HYDROMORPHONE HYDROCHLORIDE 1 MILLIGRAM(S): 2 INJECTION INTRAMUSCULAR; INTRAVENOUS; SUBCUTANEOUS at 00:43

## 2018-05-12 RX ADMIN — SODIUM CHLORIDE 200 MILLILITER(S): 9 INJECTION INTRAMUSCULAR; INTRAVENOUS; SUBCUTANEOUS at 11:49

## 2018-05-12 RX ADMIN — OXYCODONE HYDROCHLORIDE 5 MILLIGRAM(S): 5 TABLET ORAL at 14:30

## 2018-05-12 NOTE — H&P ADULT - PROBLEM SELECTOR PLAN 1
Abdominal pain radiating to back, lipase of 1300, CT scan showing mild pancreatitis  Possibly secondary to gall stones and/or possible cholangitis given elevated LFTs  Will evaluate with RUQ/abdominal US  Patient given IVF bolus in the ED  Will start NS at 200 cc/hr  Clear liquid diet for now

## 2018-05-12 NOTE — H&P ADULT - FAMILY HISTORY
Father  Still living? Unknown  Family history of diabetes mellitus, Age at diagnosis: Age Unknown  Family history of heart disease, Age at diagnosis: Age Unknown     Mother  Still living? Unknown  Family history of hypertension, Age at diagnosis: Age Unknown  Family history of esophageal cancer, Age at diagnosis: Age Unknown

## 2018-05-12 NOTE — H&P ADULT - HISTORY OF PRESENT ILLNESS
78y Female with PMH of HTN, HLD, Gastritis, ?paroxysmal Afib, s/p bypass surgery in 2016, presenting with upper abdominal pain that acutely on Thursday night after eating some chicken for dinner. Patient states the pain was severe, 10/10 at times, located in the epigastric region, radiating to the upper back, without any relieving factors, and was associated with 3 episodes of vomiting unknown colour. Patient states she was ultimately able to fall asleep but the pain continued when the patient woke up prompting the patient to come to the ED, where she subsequently had another episode of vomiting. Of note patient states she has had similar epigastric pain since fall of last year and she follows with Dr. Fonseca (GI), patient further had endoscopy 2 weeks ago and was diagnosed with gastritis. Patient otherwise denies any fever, abdominal distension, chest pain, palpitations, urinary symptoms, diarrhea, sick contacts, alcohol ingstion or other complaints at this time.

## 2018-05-12 NOTE — H&P ADULT - PROBLEM SELECTOR PLAN 4
Patient with fever to 101.4 F on admission  Likely secondary to acute pancreatitis  will evaluate with MRI abdomen to assess for other source of infection  will start Invanz for empiric gram negative and anaerobic coverage

## 2018-05-12 NOTE — H&P ADULT - NSHPPHYSICALEXAM_GEN_ALL_CORE
Vital Signs Last 24 Hrs  T(C): 36.9 (12 May 2018 04:16), Max: 38.6 (11 May 2018 21:29)  T(F): 98.4 (12 May 2018 04:16), Max: 101.4 (11 May 2018 21:29)  HR: 65 (12 May 2018 04:16) (65 - 77)  BP: 107/57 (12 May 2018 04:16) (107/57 - 117/77)  RR: 20 (12 May 2018 04:16) (20 - 20)  SpO2: 94% (12 May 2018 04:16) (92% - 94%)

## 2018-05-12 NOTE — H&P ADULT - NEUROLOGICAL
Pt is NPO and awaiting surgery later this morning. Reviewed with pt current insulin regimen: Humalog per sliding scale for correction qid ac and hs. Pt verbalizes understanding. Reviewed with pt blood glucose ranged 105-207 yesterday with total of 6 units Humalog given and 127-138 this morning. Also reviewed target goals for blood glucose and relationship between infection and hyperglycemia. Pt verbalizes understanding.   Pt denies any questions at this time.     negative Alert & oriented; no sensory, motor or coordination deficits, normal reflexes

## 2018-05-12 NOTE — H&P ADULT - ATTENDING COMMENTS
I have personally seen and examined the patient, reviewed the chart, labs and diagnostics and I agree with assessment and plan as above with family medicine resident Sarabjit Walker)     Get RUQ USG to evaluate for gall stone pancreatitis  Also get MRI Abdomen to evaluate for pancreatic tail mass  start Invanz empirically, as pt with elevated LFTs, fever and Leukocytosis ( rule out cholangitis )  consult Urology for further evaluation of Left renal mass, for possible nephrectomy, as lesion looks suspicious for malignancy

## 2018-05-12 NOTE — H&P ADULT - ASSESSMENT
78y Female with PMH of HTN, HLD, Gastritis, ?paroxysmal Afib, s/p bypass surgery in 2016, presenting with upper abdominal pain that acutely on Thursday night after eating, found to be febrile here in the ED with lipase of 1300 and CT scan showing mild pancreatitis, mass in the pancreatic tail and mas in the kidney suggestive of possible renal cell carcinoma.

## 2018-05-12 NOTE — H&P ADULT - PROBLEM SELECTOR PLAN 3
2,5 cm mass in left kidney concerning left renal cell carcinoma  Urology consult for possible surgical intervention

## 2018-05-12 NOTE — PATIENT PROFILE ADULT. - NS TRANSFER PATIENT BELONGINGS
cell phone,  and wedding ring/Clothing/Jewelry/Money (specify)/Other belongings/Cell Phone/PDA (specify)

## 2018-05-13 ENCOUNTER — TRANSCRIPTION ENCOUNTER (OUTPATIENT)
Age: 78
End: 2018-05-13

## 2018-05-13 LAB
ALBUMIN SERPL ELPH-MCNC: 2.6 G/DL — LOW (ref 3.3–5.2)
ALP SERPL-CCNC: 197 U/L — HIGH (ref 40–120)
ALT FLD-CCNC: 139 U/L — HIGH
ANION GAP SERPL CALC-SCNC: 13 MMOL/L — SIGNIFICANT CHANGE UP (ref 5–17)
AST SERPL-CCNC: 65 U/L — HIGH
BASOPHILS # BLD AUTO: 0 K/UL — SIGNIFICANT CHANGE UP (ref 0–0.2)
BASOPHILS NFR BLD AUTO: 0.3 % — SIGNIFICANT CHANGE UP (ref 0–2)
BILIRUB SERPL-MCNC: 0.4 MG/DL — SIGNIFICANT CHANGE UP (ref 0.4–2)
BUN SERPL-MCNC: 14 MG/DL — SIGNIFICANT CHANGE UP (ref 8–20)
CALCIUM SERPL-MCNC: 8.6 MG/DL — SIGNIFICANT CHANGE UP (ref 8.6–10.2)
CHLORIDE SERPL-SCNC: 105 MMOL/L — SIGNIFICANT CHANGE UP (ref 98–107)
CO2 SERPL-SCNC: 26 MMOL/L — SIGNIFICANT CHANGE UP (ref 22–29)
CREAT SERPL-MCNC: 0.79 MG/DL — SIGNIFICANT CHANGE UP (ref 0.5–1.3)
EOSINOPHIL # BLD AUTO: 0.2 K/UL — SIGNIFICANT CHANGE UP (ref 0–0.5)
EOSINOPHIL NFR BLD AUTO: 2 % — SIGNIFICANT CHANGE UP (ref 0–6)
GLUCOSE SERPL-MCNC: 71 MG/DL — SIGNIFICANT CHANGE UP (ref 70–115)
HCT VFR BLD CALC: 31.7 % — LOW (ref 37–47)
HGB BLD-MCNC: 9.9 G/DL — LOW (ref 12–16)
LIDOCAIN IGE QN: 111 U/L — HIGH (ref 22–51)
LYMPHOCYTES # BLD AUTO: 0.9 K/UL — LOW (ref 1–4.8)
LYMPHOCYTES # BLD AUTO: 9.4 % — LOW (ref 20–55)
MAGNESIUM SERPL-MCNC: 1.4 MG/DL — LOW (ref 1.6–2.6)
MCHC RBC-ENTMCNC: 29.6 PG — SIGNIFICANT CHANGE UP (ref 27–31)
MCHC RBC-ENTMCNC: 31.2 G/DL — LOW (ref 32–36)
MCV RBC AUTO: 94.6 FL — SIGNIFICANT CHANGE UP (ref 81–99)
MONOCYTES # BLD AUTO: 0.6 K/UL — SIGNIFICANT CHANGE UP (ref 0–0.8)
MONOCYTES NFR BLD AUTO: 5.9 % — SIGNIFICANT CHANGE UP (ref 3–10)
NEUTROPHILS # BLD AUTO: 8.1 K/UL — HIGH (ref 1.8–8)
NEUTROPHILS NFR BLD AUTO: 82.2 % — HIGH (ref 37–73)
PHOSPHATE SERPL-MCNC: 2.4 MG/DL — SIGNIFICANT CHANGE UP (ref 2.4–4.7)
PLATELET # BLD AUTO: 195 K/UL — SIGNIFICANT CHANGE UP (ref 150–400)
POTASSIUM SERPL-MCNC: 4 MMOL/L — SIGNIFICANT CHANGE UP (ref 3.5–5.3)
POTASSIUM SERPL-SCNC: 4 MMOL/L — SIGNIFICANT CHANGE UP (ref 3.5–5.3)
PROT SERPL-MCNC: 5.2 G/DL — LOW (ref 6.6–8.7)
RBC # BLD: 3.35 M/UL — LOW (ref 4.4–5.2)
RBC # FLD: 13.4 % — SIGNIFICANT CHANGE UP (ref 11–15.6)
SODIUM SERPL-SCNC: 144 MMOL/L — SIGNIFICANT CHANGE UP (ref 135–145)
WBC # BLD: 9.9 K/UL — SIGNIFICANT CHANGE UP (ref 4.8–10.8)
WBC # FLD AUTO: 9.9 K/UL — SIGNIFICANT CHANGE UP (ref 4.8–10.8)

## 2018-05-13 PROCEDURE — 99233 SBSQ HOSP IP/OBS HIGH 50: CPT

## 2018-05-13 PROCEDURE — 71045 X-RAY EXAM CHEST 1 VIEW: CPT | Mod: 26

## 2018-05-13 PROCEDURE — 93010 ELECTROCARDIOGRAM REPORT: CPT

## 2018-05-13 PROCEDURE — 74182 MRI ABDOMEN W/CONTRAST: CPT | Mod: 26

## 2018-05-13 PROCEDURE — 99232 SBSQ HOSP IP/OBS MODERATE 35: CPT | Mod: GC

## 2018-05-13 RX ORDER — MAGNESIUM SULFATE 500 MG/ML
2 VIAL (ML) INJECTION EVERY 4 HOURS
Qty: 0 | Refills: 0 | Status: COMPLETED | OUTPATIENT
Start: 2018-05-13 | End: 2018-05-13

## 2018-05-13 RX ORDER — METOPROLOL TARTRATE 50 MG
25 TABLET ORAL
Qty: 0 | Refills: 0 | Status: DISCONTINUED | OUTPATIENT
Start: 2018-05-13 | End: 2018-05-13

## 2018-05-13 RX ORDER — SODIUM CHLORIDE 9 MG/ML
1000 INJECTION, SOLUTION INTRAVENOUS
Qty: 0 | Refills: 0 | Status: DISCONTINUED | OUTPATIENT
Start: 2018-05-13 | End: 2018-05-14

## 2018-05-13 RX ORDER — METOPROLOL TARTRATE 50 MG
25 TABLET ORAL
Qty: 0 | Refills: 0 | Status: DISCONTINUED | OUTPATIENT
Start: 2018-05-13 | End: 2018-05-14

## 2018-05-13 RX ORDER — POTASSIUM CHLORIDE 20 MEQ
40 PACKET (EA) ORAL ONCE
Qty: 0 | Refills: 0 | Status: COMPLETED | OUTPATIENT
Start: 2018-05-13 | End: 2018-05-13

## 2018-05-13 RX ADMIN — Medication 250 MILLIGRAM(S): at 05:26

## 2018-05-13 RX ADMIN — Medication 50 GRAM(S): at 14:08

## 2018-05-13 RX ADMIN — Medication 50 GRAM(S): at 18:20

## 2018-05-13 RX ADMIN — ERTAPENEM SODIUM 120 MILLIGRAM(S): 1 INJECTION, POWDER, LYOPHILIZED, FOR SOLUTION INTRAMUSCULAR; INTRAVENOUS at 06:28

## 2018-05-13 RX ADMIN — PANTOPRAZOLE SODIUM 40 MILLIGRAM(S): 20 TABLET, DELAYED RELEASE ORAL at 18:20

## 2018-05-13 RX ADMIN — Medication 40 MILLIEQUIVALENT(S): at 05:26

## 2018-05-13 RX ADMIN — PANTOPRAZOLE SODIUM 40 MILLIGRAM(S): 20 TABLET, DELAYED RELEASE ORAL at 05:26

## 2018-05-13 RX ADMIN — Medication 250 MILLIGRAM(S): at 18:20

## 2018-05-13 NOTE — DISCHARGE NOTE ADULT - MEDICATION SUMMARY - MEDICATIONS TO TAKE
I will START or STAY ON the medications listed below when I get home from the hospital:    aspirin 81 mg oral tablet, chewable  -- 1 tab(s) by mouth once a day  -- Indication: For Hypertension     oxyCODONE 5 mg oral tablet  -- 1 tab(s) by mouth every 6 hours, As needed, Moderate Pain (4 - 6) MDD:do not take more than 4 tablets per day  -- Indication: For Pain     acetaminophen 325 mg oral tablet  -- 2 tab(s) by mouth every 6 hours, As needed, Mild Pain (1 - 3)  -- Indication: For Pain     lisinopril 10 mg oral tablet  -- 1 tab(s) by mouth once a day   -- Do not take this drug if you are pregnant.  It is very important that you take or use this exactly as directed.  Do not skip doses or discontinue unless directed by your doctor.  Some non-prescription drugs may aggravate your condition.  Read all labels carefully.  If a warning appears, check with your doctor before taking.    -- Indication: For Hypertension    rosuvastatin 20 mg oral tablet  -- 1 tab(s) by mouth once a day (at bedtime)  -- Indication: For Hyperlipidemia     dicyclomine 10 mg oral capsule  -- 1 cap(s) by mouth 3 times a day  -- Indication: For Antispasmodic     Librax 5 mg-2.5 mg oral capsule  -- 1 cap(s) by mouth 2 times a day  -- Indication: For Antispasmodic    pantoprazole 40 mg oral delayed release tablet  -- 1 tab(s) by mouth once a day (before a meal)  -- Indication: For Gerd

## 2018-05-13 NOTE — DISCHARGE NOTE ADULT - CARE PLAN
Principal Discharge DX:	Acute pancreatitis, unspecified complication status, unspecified pancreatitis type  Secondary Diagnosis:	Renal mass, left  Assessment and plan of treatment:	Follow up with Urology within 1 week of discharge as requested by Urologist for further workup of your Renal Mass. Principal Discharge DX:	Acute pancreatitis, unspecified complication status, unspecified pancreatitis type  Goal:	Monitor  Assessment and plan of treatment:	You had pancreatitis likely because of gallstones. The stones were removed along with your gall bladder. It is important that you eat a diet low in fat to ensure  Secondary Diagnosis:	Renal mass, left  Assessment and plan of treatment:	Follow up with Urology within 1 week of discharge as requested by Urologist for further workup of your Renal Mass. Principal Discharge DX:	Acute pancreatitis, unspecified complication status, unspecified pancreatitis type  Goal:	Monitor  Assessment and plan of treatment:	You had pancreatitis likely because of gallstones. The stones were removed along with your gall bladder. It is important that you eat a diet low in fat to decrease the chance of complications.  Secondary Diagnosis:	Renal mass, left  Assessment and plan of treatment:	Follow up with Urology within 1 week of discharge as requested by Urologist for further workup of your Renal Mass.  Secondary Diagnosis:	Gastritis  Goal:	Follow-up  Assessment and plan of treatment:	Be sure to take precautions to limit/avoid symptoms. This includes eating small, frequent meals instead of 3 large meals per day. This also includes limiting consumption of: spicy foods, alcohol, fatty foods, chocolate, coffee, peppermint, tomatoes/tomato products. Avoid laying down for at least 3 hours after a meal. Also, you may benefit from using medications to control the acid in your stomach called proton pump inhibitors or H2 blockers, some examples of these that can be purchased over the counter include Pantoprazole (which you are currently on), Omeprazole, Ranitidine.  Secondary Diagnosis:	Essential hypertension  Goal:	BP<150/90  Assessment and plan of treatment:	Be sure to follow a low salt diet. If you have been prescribed antihypertensive medications to control your blood pressure, be sure to take them every day as prescribed and do not miss any doses, the medications do not work if they are not taken consistently. Follow up with your Primary Care Doctor and have your Blood Pressure checked.  Secondary Diagnosis:	Hyperlipidemia  Goal:	LDL<110, Total cholesterol <200  Assessment and plan of treatment:	Your cholesterol medication was held during this admission because your liver enzymes were elevated. Please follow-up with your PMD in 3-5 days to discuss resuming your medications. Principal Discharge DX:	Acute pancreatitis, unspecified complication status, unspecified pancreatitis type  Goal:	Monitor  Assessment and plan of treatment:	You had pancreatitis likely because of gallstones. The stones were removed along with your gall bladder. It is important that you eat a diet low in fat to decrease the chance of complications.  follow up with your primary care doctor and take your medication as prescribe  Secondary Diagnosis:	Renal mass, left  Goal:	outpatient evaluation , possible surgery  Assessment and plan of treatment:	Follow up with Urology within 1 week of discharge as requested by Urologist for further workup of your Renal Mass.  follow up with your primary care doctor and take your medication as prescribe  Secondary Diagnosis:	Gastritis  Goal:	Follow-up  Assessment and plan of treatment:	Be sure to take precautions to limit/avoid symptoms. This includes eating small, frequent meals instead of 3 large meals per day. This also includes limiting consumption of: spicy foods, alcohol, fatty foods, chocolate, coffee, peppermint, tomatoes/tomato products. Avoid laying down for at least 3 hours after a meal. Also, you may benefit from using medications to control the acid in your stomach called proton pump inhibitors or H2 blockers, some examples of these that can be purchased over the counter include Pantoprazole (which you are currently on), Omeprazole, Ranitidine.  follow up with your primary care doctor and take your medication as prescribe  Secondary Diagnosis:	Essential hypertension  Goal:	BP<150/90  Assessment and plan of treatment:	Be sure to follow a low salt diet. If you have been prescribed antihypertensive medications to control your blood pressure, be sure to take them every day as prescribed and do not miss any doses, the medications do not work if they are not taken consistently. Follow up with your Primary Care Doctor and have your Blood Pressure checked.  follow up with your primary care doctor and take your medication as prescribe  for now stop treatment with metoprolol , this medication can decrease your hear rate  you can continue treatment with lisinopril daily  Secondary Diagnosis:	Hyperlipidemia  Goal:	LDL<110, Total cholesterol <200  Assessment and plan of treatment:	Your cholesterol medication was held during this admission because your liver enzymes were elevated. Please follow-up with your PMD in 3-5 days to discuss resuming your medications.  for now you can continue treatment with cholesterol medication, your liver enzymes are improving after your surgery  follow up with your primary care doctor and take your medication as prescribe  Secondary Diagnosis:	S/P cholecystectomy  Goal:	stable  Assessment and plan of treatment:	your will need follow up in 2 weeks with the surgeon  for evaluation   follow up with your primary care doctor and take your medication as prescribe

## 2018-05-13 NOTE — PROGRESS NOTE ADULT - SUBJECTIVE AND OBJECTIVE BOX
patient at MRI.    will sign off for now.    should fu with us as outpatient for discussion of her renal mass and recurrent UTIs.

## 2018-05-13 NOTE — PROGRESS NOTE ADULT - ASSESSMENT
Patient was admitted with biliary pancreatitis which has improved. Awaiting MRI to rule out choledocholithiasis. Also patient has been pancreatic tail lesion and kidney lesion.   Based on MRI findings, we will decide the need of EUS/ERCP.  Continue antibiotics.  If she does well, and there is no evidence of any choledocholithiasis on MRCP, we will start clear liquid diet tomorrow.  EUS can be performed as outpatient.

## 2018-05-13 NOTE — PROGRESS NOTE ADULT - SUBJECTIVE AND OBJECTIVE BOX
INTERVAL HPI/OVERNIGHT EVENTS: Patient followup for biliary pancreatitis. She has rapidly improved. MRI is still pending.On antibiotics at this time. LFTs and lipase is improving.Cardiology evaluation was performed.    MEDICATIONS  (STANDING):  ertapenem  IVPB      ertapenem  IVPB 1000 milliGRAM(s) IV Intermittent every 24 hours  lactated ringers. 1000 milliLiter(s) (200 mL/Hr) IV Continuous <Continuous>  magnesium sulfate  IVPB 2 Gram(s) IV Intermittent every 4 hours  metoprolol tartrate 25 milliGRAM(s) Oral two times a day  pantoprazole  Injectable 40 milliGRAM(s) IV Push two times a day  saccharomyces boulardii 250 milliGRAM(s) Oral two times a day    MEDICATIONS  (PRN):  acetaminophen   Tablet 650 milliGRAM(s) Oral every 6 hours PRN For Temp greater than 38 C (100.4 F)  acetaminophen   Tablet. 650 milliGRAM(s) Oral every 6 hours PRN Mild Pain (1 - 3)  oxyCODONE    IR 5 milliGRAM(s) Oral every 6 hours PRN Moderate Pain (4 - 6)      Allergies    No Known Allergies    Intolerances        Vital Signs Last 24 Hrs  T(C): 36.4 (13 May 2018 07:30), Max: 38 (12 May 2018 23:17)  T(F): 97.5 (13 May 2018 07:30), Max: 100.4 (12 May 2018 23:17)  HR: 53 (13 May 2018 07:30) (47 - 68)  BP: 109/61 (13 May 2018 07:30) (109/61 - 133/70)  BP(mean): --  RR: 19 (13 May 2018 07:30) (18 - 20)  SpO2: 94% (13 May 2018 07:30) (92% - 98%)    LABS:                        9.9    9.9   )-----------( 195      ( 13 May 2018 07:02 )             31.7     05-13    144  |  105  |  14.0  ----------------------------<  71  4.0   |  26.0  |  0.79    Ca    8.6      13 May 2018 07:02  Phos  2.4     05-13  Mg     1.4     05-13    TPro  5.2<L>  /  Alb  2.6<L>  /  TBili  0.4  /  DBili  x   /  AST  65<H>  /  ALT  139<H>  /  AlkPhos  197<H>  05-13      Urinalysis Basic - ( 11 May 2018 17:59 )    Color: Yellow / Appearance: Clear / S.020 / pH: x  Gluc: x / Ketone: Trace  / Bili: Negative / Urobili: 8 mg/dL   Blood: x / Protein: 30 mg/dL / Nitrite: Negative   Leuk Esterase: Moderate / RBC: 3-5 /HPF / WBC 6-10   Sq Epi: x / Non Sq Epi: Few / Bacteria: Few    Lipase, Serum in AM (18 @ 07:02)    Lipase, Serum: 111 U/L        RADIOLOGY & ADDITIONAL TESTS:  < from: Xray Chest 1 View AP/PA. (17 @ 19:37) >    INTERPRETATION:  HISTORY: Chest pain today  TECHNIQUE: Portable frontal view of the chest, 1 view.  COMPARISON: none.  FINDINGS:     HEART: normal in size   LUNGS: free of consolidation or effusion.    OSSEOUS STRUCTURES:: degenerative changes    IMPRESSION:   No infiltrates.    < end of copied text >

## 2018-05-13 NOTE — DISCHARGE NOTE ADULT - ADDITIONAL INSTRUCTIONS
follow up with your primary care doctor and take your medication as prescribe   follow up with surgeon in 2 weeks   follow up with urology in 7-10 days

## 2018-05-13 NOTE — DISCHARGE NOTE ADULT - PLAN OF CARE
Follow up with Urology within 1 week of discharge as requested by Urologist for further workup of your Renal Mass. Monitor You had pancreatitis likely because of gallstones. The stones were removed along with your gall bladder. It is important that you eat a diet low in fat to ensure You had pancreatitis likely because of gallstones. The stones were removed along with your gall bladder. It is important that you eat a diet low in fat to decrease the chance of complications. Follow-up Be sure to take precautions to limit/avoid symptoms. This includes eating small, frequent meals instead of 3 large meals per day. This also includes limiting consumption of: spicy foods, alcohol, fatty foods, chocolate, coffee, peppermint, tomatoes/tomato products. Avoid laying down for at least 3 hours after a meal. Also, you may benefit from using medications to control the acid in your stomach called proton pump inhibitors or H2 blockers, some examples of these that can be purchased over the counter include Pantoprazole (which you are currently on), Omeprazole, Ranitidine. BP<150/90 Be sure to follow a low salt diet. If you have been prescribed antihypertensive medications to control your blood pressure, be sure to take them every day as prescribed and do not miss any doses, the medications do not work if they are not taken consistently. Follow up with your Primary Care Doctor and have your Blood Pressure checked. LDL<110, Total cholesterol <200 Your cholesterol medication was held during this admission because your liver enzymes were elevated. Please follow-up with your PMD in 3-5 days to discuss resuming your medications. You had pancreatitis likely because of gallstones. The stones were removed along with your gall bladder. It is important that you eat a diet low in fat to decrease the chance of complications.  follow up with your primary care doctor and take your medication as prescribe outpatient evaluation , possible surgery Follow up with Urology within 1 week of discharge as requested by Urologist for further workup of your Renal Mass.  follow up with your primary care doctor and take your medication as prescribe Be sure to take precautions to limit/avoid symptoms. This includes eating small, frequent meals instead of 3 large meals per day. This also includes limiting consumption of: spicy foods, alcohol, fatty foods, chocolate, coffee, peppermint, tomatoes/tomato products. Avoid laying down for at least 3 hours after a meal. Also, you may benefit from using medications to control the acid in your stomach called proton pump inhibitors or H2 blockers, some examples of these that can be purchased over the counter include Pantoprazole (which you are currently on), Omeprazole, Ranitidine.  follow up with your primary care doctor and take your medication as prescribe Be sure to follow a low salt diet. If you have been prescribed antihypertensive medications to control your blood pressure, be sure to take them every day as prescribed and do not miss any doses, the medications do not work if they are not taken consistently. Follow up with your Primary Care Doctor and have your Blood Pressure checked.  follow up with your primary care doctor and take your medication as prescribe  for now stop treatment with metoprolol , this medication can decrease your hear rate  you can continue treatment with lisinopril daily Your cholesterol medication was held during this admission because your liver enzymes were elevated. Please follow-up with your PMD in 3-5 days to discuss resuming your medications.  for now you can continue treatment with cholesterol medication, your liver enzymes are improving after your surgery  follow up with your primary care doctor and take your medication as prescribe stable your will need follow up in 2 weeks with the surgeon  for evaluation   follow up with your primary care doctor and take your medication as prescribe

## 2018-05-13 NOTE — PROGRESS NOTE ADULT - SUBJECTIVE AND OBJECTIVE BOX
Patient underwent MRI. Final read pending. She has choledocholithiasis on MRI. Will schedule for ERCP tomorrow  Hold any anticoagulation  ERCP orders placed  Already on antibiotics  NPO order placed  Surgical evaluation recommended for possible cholecystectomy

## 2018-05-13 NOTE — PROGRESS NOTE ADULT - SUBJECTIVE AND OBJECTIVE BOX
CC: Patient is a 78y old  Female who presents with a chief complaint of Abdominal pain (12 May 2018 06:11)    Patient seen and examined at bedside, No acute overnight events.  Patient ambulating, eating well, voiding and last BM_.  Cardiac monitor reviewed;    ROS: Denies fever, chest pain, SOB, abdominal pain, diarrhea, constipation, calf pain.    VS:   Vital Signs Last 24 Hrs  T(C): 36.4 (13 May 2018 02:47), Max: 38 (12 May 2018 23:17)  T(F): 97.6 (13 May 2018 02:47), Max: 100.4 (12 May 2018 23:17)  HR: 47 (13 May 2018 02:47) (47 - 68)  BP: 111/61 (12 May 2018 23:17) (111/61 - 133/70)  BP(mean): --  RR: 19 (12 May 2018 23:17) (18 - 20)  SpO2: 92% (12 May 2018 23:17) (92% - 98%)  Physical Exam:   Gen: NAD  HEENT: NCAT, EOMI, PERRLA, Pupils_  CVS: RRR, +S1/S2, no murmurs, rubs or gallops appreciated  Lungs: CTAB, no wheeze, rales, rhonchi  Abdomen: +BS, soft, ND, NT. no palpable flank tenderness or mass, no CVA tenderness  Ext: No cyanosis, edema or calf tenderness  Neuro: AAOx3, no focal deficits.    Labs:                        10.6   10.4  )-----------( 219      ( 12 May 2018 10:14 )             33.4   05-12    142  |  103  |  16.0  ----------------------------<  98  3.3<L>   |  27.0  |  0.89    Ca    8.3<L>      12 May 2018 10:14    TPro  5.5<L>  /  Alb  3.0<L>  /  TBili  0.7  /  DBili  x   /  AST  215<H>  /  ALT  247<H>  /  AlkPhos  252<H>  05-12        Radiology:  < from: US Abdomen Limited (05.12.18 @ 07:17) >    INTERPRETATION:  CLINICAL INFORMATION: Not eating    COMPARISON: None available.    TECHNIQUE: Sonography of the right upper quadrant.     FINDINGS:    Liver: Within normal limits.    Bile ducts: Dilated common bile duct measuring up to 1.3 cm. Distal   common bile duct is not well visualized secondary to bowel gas. Mild   intrahepatic biliary dilatation.    Gallbladder: Contracted. Gallstones.        Pancreas: Visualized portions are within normal limits.    Right kidney: 11 cm. No hydronephrosis. Increased echogenicity of the   renal parenchyma parenchymal thinning compatible medical renal disease.   Subcentimeter right renal cyst.    Ascites: None.    IVC: Visualized portions are within normal limits.    IMPRESSION:     Dilatation of the common bile duct with mild intrahepatic biliary   dilatation as seen on recent CT.    < end of copied text >      Medications:  MEDICATIONS  (STANDING):  ertapenem  IVPB      ertapenem  IVPB 1000 milliGRAM(s) IV Intermittent every 24 hours  lactated ringers. 1000 milliLiter(s) (200 mL/Hr) IV Continuous <Continuous>  pantoprazole  Injectable 40 milliGRAM(s) IV Push two times a day  saccharomyces boulardii 250 milliGRAM(s) Oral two times a day    MEDICATIONS  (PRN):  acetaminophen   Tablet 650 milliGRAM(s) Oral every 6 hours PRN For Temp greater than 38 C (100.4 F)  acetaminophen   Tablet. 650 milliGRAM(s) Oral every 6 hours PRN Mild Pain (1 - 3)  oxyCODONE    IR 5 milliGRAM(s) Oral every 6 hours PRN Moderate Pain (4 - 6) CC: Patient is a 78y old  Female who presents with a chief complaint of Abdominal pain (12 May 2018 06:11)      Overnight/AM events:   Patient seen and examined at bedside, No acute overnight events.  Patient ambulating, eating well, voiding and last BM last night with loose stool.  Cardiac monitor reviewed; Bradycardia down to 44, asymptomatic. Desat down to 87%, patient placed on 3L Nasal Cannula O2.     This morning patient says that she feels better. Says that she has always had belly pain from what she remembers but now says that pain is gone. Recently was given pain medication which helps but prior to medication being given, says that her abdominal pain was better than prior to coming to hospital. Denies fever, chills, chest pain, SOB, abdominal pain, diarrhea, constipation, calf pain.    VS:   Vital Signs Last 24 Hrs  T(C): 36.4 (13 May 2018 02:47), Max: 38 (12 May 2018 23:17)  T(F): 97.6 (13 May 2018 02:47), Max: 100.4 (12 May 2018 23:17)  HR: 47 (13 May 2018 02:47) (47 - 68)  BP: 111/61 (12 May 2018 23:17) (111/61 - 133/70)  BP(mean): --  RR: 19 (12 May 2018 23:17) (18 - 20)  SpO2: 92% (12 May 2018 23:17) (92% - 98%)    Physical Exam:   Gen: pleasant elderly female, laying in bed, NAD  HEENT: NCAT, EOMI, PERRLA, Pupils 3mm to 2mm, moist mucous membranes  CVS: RRR, +S1/S2, no murmurs, rubs or gallops appreciated  Lungs: CTAB, no wheeze, rales, rhonchi  Abdomen: +BS, soft, ND, mild tenderness to epigastrium. no palpable flank tenderness or mass, no CVA tenderness  Ext: No cyanosis, edema or calf tenderness. 2+DP pulses b/l.   Neuro: AAOx3, no focal deficits.    Labs:                        10.6   10.4  )-----------( 219      ( 12 May 2018 10:14 )             33.4   05-12    142  |  103  |  16.0  ----------------------------<  98  3.3<L>   |  27.0  |  0.89    Ca    8.3<L>      12 May 2018 10:14    TPro  5.5<L>  /  Alb  3.0<L>  /  TBili  0.7  /  DBili  x   /  AST  215<H>  /  ALT  247<H>  /  AlkPhos  252<H>  05-12        Radiology:  < from: US Abdomen Limited (05.12.18 @ 07:17) >    INTERPRETATION:  CLINICAL INFORMATION: Not eating    COMPARISON: None available.    TECHNIQUE: Sonography of the right upper quadrant.     FINDINGS:    Liver: Within normal limits.    Bile ducts: Dilated common bile duct measuring up to 1.3 cm. Distal   common bile duct is not well visualized secondary to bowel gas. Mild   intrahepatic biliary dilatation.    Gallbladder: Contracted. Gallstones.        Pancreas: Visualized portions are within normal limits.    Right kidney: 11 cm. No hydronephrosis. Increased echogenicity of the   renal parenchyma parenchymal thinning compatible medical renal disease.   Subcentimeter right renal cyst.    Ascites: None.    IVC: Visualized portions are within normal limits.    IMPRESSION:     Dilatation of the common bile duct with mild intrahepatic biliary   dilatation as seen on recent CT.    < end of copied text >      Medications:  MEDICATIONS  (STANDING):  ertapenem  IVPB      ertapenem  IVPB 1000 milliGRAM(s) IV Intermittent every 24 hours  lactated ringers. 1000 milliLiter(s) (200 mL/Hr) IV Continuous <Continuous>  pantoprazole  Injectable 40 milliGRAM(s) IV Push two times a day  saccharomyces boulardii 250 milliGRAM(s) Oral two times a day    MEDICATIONS  (PRN):  acetaminophen   Tablet 650 milliGRAM(s) Oral every 6 hours PRN For Temp greater than 38 C (100.4 F)  acetaminophen   Tablet. 650 milliGRAM(s) Oral every 6 hours PRN Mild Pain (1 - 3)  oxyCODONE    IR 5 milliGRAM(s) Oral every 6 hours PRN Moderate Pain (4 - 6)

## 2018-05-13 NOTE — DISCHARGE NOTE ADULT - REASON FOR ADMISSION
Doc of day- Lo patient     Incoming call from [facility] from [employer].  Patient had nasal swabs done on 1/16/17.  Reports NEGATIVE for MRSA and POSITIVE for staph.    Please advise    Abdominal pain

## 2018-05-13 NOTE — DISCHARGE NOTE ADULT - PATIENT PORTAL LINK FT
You can access the [facility] Patient Portal, offered by [facility], by registering with the following website: [email or web address]

## 2018-05-13 NOTE — DISCHARGE NOTE ADULT - OTHER SIGNIFICANT FINDINGS
11.4   6.8   )-----------( 268      ( 16 May 2018 06:34 )             35.2   05-16    142  |  103  |  10.0  ----------------------------<  112  4.3   |  28.0  |  0.75    Ca    8.9      16 May 2018 06:34  Phos  3.6     05-16  Mg     1.7     05-16    TPro  5.7<L>  /  Alb  2.8<L>  /  TBili  0.2<L>  /  DBili  x   /  AST  27  /  ALT  67<H>  /  AlkPhos  173<H>  05-16  < from: CT Abdomen and Pelvis w/ Oral Cont and w/ IV Cont (05.11.18 @ 19:56) >  LIVER: Within normal limits.  SPLEEN: Within normal limits.  PANCREAS: 1.4 cm solid slightly hyperdense nodule in the tail of the   pancreas. No pancreatic ductal dilatation. Mild peripancreatic   inflammatory changes. No necrosis or peripancreatic fluid collection.  GALLBLADDER: Within normal limits.  BILE DUCTS: Mild intra and extra hepatic biliary ductal dilatation, new   from prior examinations, with the common duct measuring 1.5 cm. No   visible stones seen along the course of the duct.  ADRENALS: Within normal limits.  KIDNEYS/URETERS: 2.5 cm solid enhancing mass in the left kidney, not   significantly changed in size. Subcentimeter bilateral renal lesions, too   small to characterize.    RETROPERITONEUM: No lymphadenopathy.    VESSELS:  Normal caliber aorta. Patent left renal vein and inferior vena   cava.     BOWEL: No bowel obstruction, wall thickening or inflammatory change.   Appendix normal.  PERITONEUM: No ascites or pneumoperitoneum.    REPRODUCTIVE ORGANS: Hysterectomy. Normal ovaries.  BLADDER: Within normal limits.    ABDOMINAL WALL: Within normal limits.  BONES: No acute bony abnormality. Sternotomy.    IMPRESSION:     Mild acute pancreatitis.    New biliary ductal dilatation  as compared with February 25, 2018.   Recommend MRCP to evaluate for choledocholithiasis.    No change in solid enhancing left renal mass suspicious for renal cell   carcinoma.    < end of copied text >  < from: MR Abdomen w/ IV Cont (05.13.18 @ 16:23) >  IMPRESSION:     Distal common bile duct stone results in mild intrahepatic and   extrahepatic biliary ductal dilatation.    Gallstones.    Findings compatible with interstitial edematous pancreatitis.    Enhancing left renal lesion compatible with a renal neoplasm.     < end of copied text >

## 2018-05-13 NOTE — DISCHARGE NOTE ADULT - HOSPITAL COURSE
78y Female with PMH of HTN, HLD, Gastritis, ?paroxysmal Afib, s/p bypass surgery in 2016, presenting with upper abdominal pain that acutely on Thursday night after eating some chicken for dinner. Patient was found to have elevated Lipase levels with CT and Ultrasound findings consistent w    Patient states the pain was severe, 10/10 at times, located in the epigastric region, radiating to the upper back, without any relieving factors, and was associated with 3 episodes of vomiting unknown colour. Patient states she was ultimately able to fall asleep but the pain continued when the patient woke up prompting the patient to come to the ED, where she subsequently had another episode of vomiting. Of note patient states she has had similar epigastric pain since fall of last year and she follows with Dr. Fonseca (GI), patient further had endoscopy 2 weeks ago and was diagnosed with gastritis. Patient otherwise denies any fever, abdominal distension, chest pain, palpitations, urinary symptoms, diarrhea, sick contacts, alcohol ingstion or other complaints at this time. 78y Female with PMH of HTN, HLD, Gastritis, ?paroxysmal Afib, s/p bypass surgery in 2016, presenting with upper abdominal pain that acutely on Thursday night after eating some chicken for dinner. Patient was found to have elevated Lipase levels with CT and Ultrasound findings consistent with acute pancreatitis. Patient further had an MRI and subsequently an MRCP which confirmed stones in the CBD consistent choledocholithiasis Patient with ERCP done by the GI team, with removal of a 12 mm stone as well as a laparoscopic cholecystectomy by the Surgery team to prevent further complications. Patient hospital course complicated by asymptomatic bradycardia for which Cardiology was consulted. Patient remained asymptomatic and no intervention was deemed necessary. Patient is now medically optimized for discharge. 78y Female with PMH of HTN, HLD, Gastritis, ?paroxysmal Afib, s/p bypass surgery in 2016, presenting with upper abdominal pain that acutely on Thursday night after eating some chicken for dinner. Patient was found to have elevated Lipase levels with CT and Ultrasound findings consistent with acute pancreatitis. Patient further had an MRI and subsequently an MRCP which confirmed stones in the CBD consistent choledocholithiasis Patient with ERCP done by the GI team, with removal of a 12 mm stone as well as a laparoscopic cholecystectomy by the Surgery team to prevent further complications. Patient hospital course complicated by asymptomatic bradycardia for which Cardiology was consulted. Patient remained asymptomatic and no intervention was deemed necessary. Pt will need f/u wit urology in the outpatient setting due to renal mass, she also has to f/u with surgical team in 2 weeks for evaluation of laparoscopic cholecystectomy . Due to episodes of bradycardia treatment with metoprolol was d/c. Pt  is now medically optimized for discharge.      Total time spend in this discharge: total of 45 minutes 78y Female with PMH of HTN, HLD, Gastritis, ?paroxysmal Afib, s/p bypass surgery in 2016, presenting with upper abdominal pain that acutely on Thursday night after eating some chicken for dinner. Patient was found to have elevated Lipase levels with CT and Ultrasound findings consistent with acute Gallstone  pancreatitis. Patient further had an MRI and subsequently an MRCP which confirmed stones in the CBD consistent choledocholithiasis Patient with ERCP done by the GI team, with removal of a 12 mm stone as well as a laparoscopic cholecystectomy by the Surgery team to prevent further complications. Patient hospital course complicated by asymptomatic bradycardia for which Cardiology was consulted. Patient remained asymptomatic and no intervention was deemed necessary. Pt will need f/u wit urology in the outpatient setting due to renal mass, concerning for renal cell cancer. she also has to f/u with surgical team in 2 weeks for Post op follow up after Lap cholecystectomy. Due to episodes of bradycardia treatment with metoprolol was d/c. Pt  is now medically optimized for discharge.      Total time spend in this discharge: total of 45 minutes, more than 50 % time spent on counselling and co-ordination of patient's care

## 2018-05-13 NOTE — DISCHARGE NOTE ADULT - INSTRUCTIONS
Continue a DASH (Dietary Approaches to Stop Hypertension) diet. Include more fruits, vegetables, whole grains, and low-fat dairy. Reduce intake of added sugars, solid fats, refined grains, and sodium. Limit foods that are high in saturated fat, such as fatty meats, full-fat dairy products, and tropical oils such as coconut, palm kernel, and palm oils. Limit sugar-sweetened beverages and sweets. When following the DASH eating plan, it is important to choose foods that are:  Low in saturated and trans fats  Rich in potassium, calcium, magnesium, fiber, and protein  Lower in sodium Low in saturated and trans fats due to recent cholecystectomy surgery   Rich in potassium, calcium, magnesium, fiber, and protein  Lower in sodium

## 2018-05-14 ENCOUNTER — TRANSCRIPTION ENCOUNTER (OUTPATIENT)
Age: 78
End: 2018-05-14

## 2018-05-14 ENCOUNTER — MOBILE ON CALL (OUTPATIENT)
Age: 78
End: 2018-05-14

## 2018-05-14 LAB
ALBUMIN SERPL ELPH-MCNC: 2.3 G/DL — LOW (ref 3.3–5.2)
ALP SERPL-CCNC: 204 U/L — HIGH (ref 40–120)
ALT FLD-CCNC: 97 U/L — HIGH
ANION GAP SERPL CALC-SCNC: 10 MMOL/L — SIGNIFICANT CHANGE UP (ref 5–17)
ANION GAP SERPL CALC-SCNC: 15 MMOL/L — SIGNIFICANT CHANGE UP (ref 5–17)
AST SERPL-CCNC: 36 U/L — HIGH
BASOPHILS # BLD AUTO: 0 K/UL — SIGNIFICANT CHANGE UP (ref 0–0.2)
BASOPHILS NFR BLD AUTO: 0.4 % — SIGNIFICANT CHANGE UP (ref 0–2)
BILIRUB DIRECT SERPL-MCNC: 0.1 MG/DL — SIGNIFICANT CHANGE UP (ref 0–0.3)
BILIRUB INDIRECT FLD-MCNC: 0.3 MG/DL — SIGNIFICANT CHANGE UP (ref 0.2–1)
BILIRUB SERPL-MCNC: 0.4 MG/DL — SIGNIFICANT CHANGE UP (ref 0.4–2)
BUN SERPL-MCNC: 12 MG/DL — SIGNIFICANT CHANGE UP (ref 8–20)
BUN SERPL-MCNC: 13 MG/DL — SIGNIFICANT CHANGE UP (ref 8–20)
CALCIUM SERPL-MCNC: 8.6 MG/DL — SIGNIFICANT CHANGE UP (ref 8.6–10.2)
CALCIUM SERPL-MCNC: 8.7 MG/DL — SIGNIFICANT CHANGE UP (ref 8.6–10.2)
CHLORIDE SERPL-SCNC: 100 MMOL/L — SIGNIFICANT CHANGE UP (ref 98–107)
CHLORIDE SERPL-SCNC: 102 MMOL/L — SIGNIFICANT CHANGE UP (ref 98–107)
CO2 SERPL-SCNC: 23 MMOL/L — SIGNIFICANT CHANGE UP (ref 22–29)
CO2 SERPL-SCNC: 28 MMOL/L — SIGNIFICANT CHANGE UP (ref 22–29)
CREAT SERPL-MCNC: 0.68 MG/DL — SIGNIFICANT CHANGE UP (ref 0.5–1.3)
CREAT SERPL-MCNC: 0.84 MG/DL — SIGNIFICANT CHANGE UP (ref 0.5–1.3)
EOSINOPHIL # BLD AUTO: 0.2 K/UL — SIGNIFICANT CHANGE UP (ref 0–0.5)
EOSINOPHIL NFR BLD AUTO: 3.3 % — SIGNIFICANT CHANGE UP (ref 0–6)
GLUCOSE BLDC GLUCOMTR-MCNC: 114 MG/DL — HIGH (ref 70–99)
GLUCOSE SERPL-MCNC: 133 MG/DL — HIGH (ref 70–115)
GLUCOSE SERPL-MCNC: 55 MG/DL — LOW (ref 70–115)
HCT VFR BLD CALC: 32.3 % — LOW (ref 37–47)
HGB BLD-MCNC: 10.3 G/DL — LOW (ref 12–16)
LIDOCAIN IGE QN: 71 U/L — HIGH (ref 22–51)
LYMPHOCYTES # BLD AUTO: 0.7 K/UL — LOW (ref 1–4.8)
LYMPHOCYTES # BLD AUTO: 9.8 % — LOW (ref 20–55)
MAGNESIUM SERPL-MCNC: 1.5 MG/DL — LOW (ref 1.6–2.6)
MAGNESIUM SERPL-MCNC: 1.7 MG/DL — SIGNIFICANT CHANGE UP (ref 1.6–2.6)
MCHC RBC-ENTMCNC: 29.7 PG — SIGNIFICANT CHANGE UP (ref 27–31)
MCHC RBC-ENTMCNC: 31.9 G/DL — LOW (ref 32–36)
MCV RBC AUTO: 93.1 FL — SIGNIFICANT CHANGE UP (ref 81–99)
MONOCYTES # BLD AUTO: 0.4 K/UL — SIGNIFICANT CHANGE UP (ref 0–0.8)
MONOCYTES NFR BLD AUTO: 5.8 % — SIGNIFICANT CHANGE UP (ref 3–10)
NEUTROPHILS # BLD AUTO: 5.7 K/UL — SIGNIFICANT CHANGE UP (ref 1.8–8)
NEUTROPHILS NFR BLD AUTO: 80.6 % — HIGH (ref 37–73)
PHOSPHATE SERPL-MCNC: 2.3 MG/DL — LOW (ref 2.4–4.7)
PHOSPHATE SERPL-MCNC: 2.5 MG/DL — SIGNIFICANT CHANGE UP (ref 2.4–4.7)
PLATELET # BLD AUTO: 195 K/UL — SIGNIFICANT CHANGE UP (ref 150–400)
POTASSIUM SERPL-MCNC: 3.5 MMOL/L — SIGNIFICANT CHANGE UP (ref 3.5–5.3)
POTASSIUM SERPL-MCNC: 4.2 MMOL/L — SIGNIFICANT CHANGE UP (ref 3.5–5.3)
POTASSIUM SERPL-SCNC: 3.5 MMOL/L — SIGNIFICANT CHANGE UP (ref 3.5–5.3)
POTASSIUM SERPL-SCNC: 4.2 MMOL/L — SIGNIFICANT CHANGE UP (ref 3.5–5.3)
PROT SERPL-MCNC: 5.3 G/DL — LOW (ref 6.6–8.7)
RBC # BLD: 3.47 M/UL — LOW (ref 4.4–5.2)
RBC # FLD: 13.1 % — SIGNIFICANT CHANGE UP (ref 11–15.6)
SODIUM SERPL-SCNC: 138 MMOL/L — SIGNIFICANT CHANGE UP (ref 135–145)
SODIUM SERPL-SCNC: 140 MMOL/L — SIGNIFICANT CHANGE UP (ref 135–145)
TROPONIN T SERPL-MCNC: <0.01 NG/ML — SIGNIFICANT CHANGE UP (ref 0–0.06)
TSH SERPL-MCNC: 2.62 UIU/ML — SIGNIFICANT CHANGE UP (ref 0.27–4.2)
WBC # BLD: 7 K/UL — SIGNIFICANT CHANGE UP (ref 4.8–10.8)
WBC # FLD AUTO: 7 K/UL — SIGNIFICANT CHANGE UP (ref 4.8–10.8)

## 2018-05-14 PROCEDURE — 99233 SBSQ HOSP IP/OBS HIGH 50: CPT | Mod: GC

## 2018-05-14 PROCEDURE — 43264 ERCP REMOVE DUCT CALCULI: CPT

## 2018-05-14 PROCEDURE — 99232 SBSQ HOSP IP/OBS MODERATE 35: CPT

## 2018-05-14 PROCEDURE — 93010 ELECTROCARDIOGRAM REPORT: CPT

## 2018-05-14 PROCEDURE — 43262 ENDO CHOLANGIOPANCREATOGRAPH: CPT | Mod: 59

## 2018-05-14 RX ORDER — ONDANSETRON 8 MG/1
4 TABLET, FILM COATED ORAL EVERY 8 HOURS
Qty: 0 | Refills: 0 | Status: DISCONTINUED | OUTPATIENT
Start: 2018-05-14 | End: 2018-05-15

## 2018-05-14 RX ORDER — SODIUM CHLORIDE 9 MG/ML
1000 INJECTION, SOLUTION INTRAVENOUS
Qty: 0 | Refills: 0 | Status: DISCONTINUED | OUTPATIENT
Start: 2018-05-14 | End: 2018-05-15

## 2018-05-14 RX ORDER — MAGNESIUM SULFATE 500 MG/ML
2 VIAL (ML) INJECTION ONCE
Qty: 0 | Refills: 0 | Status: COMPLETED | OUTPATIENT
Start: 2018-05-14 | End: 2018-05-14

## 2018-05-14 RX ORDER — INDOMETHACIN 50 MG
100 CAPSULE ORAL ONCE
Qty: 0 | Refills: 0 | Status: DISCONTINUED | OUTPATIENT
Start: 2018-05-14 | End: 2018-05-15

## 2018-05-14 RX ORDER — METOPROLOL TARTRATE 50 MG
12.5 TABLET ORAL
Qty: 0 | Refills: 0 | Status: DISCONTINUED | OUTPATIENT
Start: 2018-05-15 | End: 2018-05-15

## 2018-05-14 RX ORDER — ROSUVASTATIN CALCIUM 5 MG/1
1 TABLET ORAL
Qty: 0 | Refills: 0 | COMMUNITY

## 2018-05-14 RX ADMIN — OXYCODONE HYDROCHLORIDE 5 MILLIGRAM(S): 5 TABLET ORAL at 10:41

## 2018-05-14 RX ADMIN — SODIUM CHLORIDE 150 MILLILITER(S): 9 INJECTION, SOLUTION INTRAVENOUS at 14:13

## 2018-05-14 RX ADMIN — PANTOPRAZOLE SODIUM 40 MILLIGRAM(S): 20 TABLET, DELAYED RELEASE ORAL at 06:06

## 2018-05-14 RX ADMIN — ONDANSETRON 4 MILLIGRAM(S): 8 TABLET, FILM COATED ORAL at 10:05

## 2018-05-14 RX ADMIN — ERTAPENEM SODIUM 120 MILLIGRAM(S): 1 INJECTION, POWDER, LYOPHILIZED, FOR SOLUTION INTRAMUSCULAR; INTRAVENOUS at 06:06

## 2018-05-14 RX ADMIN — PANTOPRAZOLE SODIUM 40 MILLIGRAM(S): 20 TABLET, DELAYED RELEASE ORAL at 17:44

## 2018-05-14 RX ADMIN — OXYCODONE HYDROCHLORIDE 5 MILLIGRAM(S): 5 TABLET ORAL at 11:20

## 2018-05-14 RX ADMIN — SODIUM CHLORIDE 150 MILLILITER(S): 9 INJECTION, SOLUTION INTRAVENOUS at 22:03

## 2018-05-14 RX ADMIN — Medication 250 MILLIGRAM(S): at 17:44

## 2018-05-14 RX ADMIN — Medication 50 GRAM(S): at 23:43

## 2018-05-14 NOTE — PROGRESS NOTE ADULT - SUBJECTIVE AND OBJECTIVE BOX
DENNIS BROWN  [id number]      Chief Complaint:  Pre-op Eval/CAD s/p CABG    Interval History:  Patient without c/o s/p ERCP.  Reports intermittent abd pain.  Denies CP/SOB/palps.    Tele:  No events      acetaminophen   Tablet 650 milliGRAM(s) Oral every 6 hours PRN  acetaminophen   Tablet. 650 milliGRAM(s) Oral every 6 hours PRN  ertapenem  IVPB      ertapenem  IVPB 1000 milliGRAM(s) IV Intermittent every 24 hours  indomethacin Suppository 100 milliGRAM(s) Rectal once  lactated ringers. 1000 milliLiter(s) IV Continuous <Continuous>  metoprolol tartrate 25 milliGRAM(s) Oral two times a day  ondansetron Injectable 4 milliGRAM(s) IV Push every 8 hours PRN  oxyCODONE    IR 5 milliGRAM(s) Oral every 6 hours PRN  pantoprazole  Injectable 40 milliGRAM(s) IV Push two times a day  saccharomyces boulardii 250 milliGRAM(s) Oral two times a day          Physical Exam:  T(C): 36.8 (05-14-18 @ 07:35), Max: 36.8 (05-14-18 @ 00:03)  HR: 60 (05-14-18 @ 07:35) (55 - 60)  BP: 131/68 (05-14-18 @ 07:35) (119/61 - 146/62)  RR: 18 (05-14-18 @ 07:35) (18 - 18)  SpO2: 95% (05-14-18 @ 07:35) (93% - 95%)  Wt(kg): --  General: Comfortable in NAD  Neck: No JVD  CVS: nl s1s2, no s3  Pulm: CTA b/l  Abd: soft, mild TTP  Ext: No c/c/e  Neuro A&O x3  Psych: Normal affect      Labs:   14 May 2018 06:43    140    |  102    |  12.0   ----------------------------<  55     3.5     |  23.0   |  0.68     Ca    8.7        14 May 2018 06:43  Phos  2.3       14 May 2018 06:43  Mg     1.7       14 May 2018 06:43    TPro  5.3    /  Alb  2.3    /  TBili  0.4    /  DBili  0.1    /  AST  36     /  ALT  97     /  AlkPhos  204    14 May 2018 06:43                          10.3   7.0   )-----------( 195      ( 14 May 2018 06:43 )             32.3         Assessment:  78yFemale PMHX CAD s/p CABG, HTN, HLD p/w abd pain and recurrent gallstone pancreatitis.  Patient feels better now s/p ERCP, for lap marsha.  Patient has been stable from CV perspective.  No evidence of CHF or ischemia.  Does >4 METs PTA without limitation.  No CV contraindication at mod CV risk.    Plan:  1. Proceed for marsha  2. Periop monitor  3. Postop EKG  4. Continue BB  5. Hold statin with transaminitis  6. Hold ASA and restart per surgery DENNIS BROWN  [id number]      Chief Complaint:  Pre-op Eval/CAD s/p CABG    Interval History:  Patient without c/o s/p ERCP.  Reports intermittent abd pain.  Denies CP/SOB/palps.    Tele:  No events      acetaminophen   Tablet 650 milliGRAM(s) Oral every 6 hours PRN  acetaminophen   Tablet. 650 milliGRAM(s) Oral every 6 hours PRN  ertapenem  IVPB      ertapenem  IVPB 1000 milliGRAM(s) IV Intermittent every 24 hours  indomethacin Suppository 100 milliGRAM(s) Rectal once  lactated ringers. 1000 milliLiter(s) IV Continuous <Continuous>  metoprolol tartrate 25 milliGRAM(s) Oral two times a day  ondansetron Injectable 4 milliGRAM(s) IV Push every 8 hours PRN  oxyCODONE    IR 5 milliGRAM(s) Oral every 6 hours PRN  pantoprazole  Injectable 40 milliGRAM(s) IV Push two times a day  saccharomyces boulardii 250 milliGRAM(s) Oral two times a day          Physical Exam:  T(C): 36.8 (05-14-18 @ 07:35), Max: 36.8 (05-14-18 @ 00:03)  HR: 60 (05-14-18 @ 07:35) (55 - 60)  BP: 131/68 (05-14-18 @ 07:35) (119/61 - 146/62)  RR: 18 (05-14-18 @ 07:35) (18 - 18)  SpO2: 95% (05-14-18 @ 07:35) (93% - 95%)  Wt(kg): --  General: Comfortable in NAD  Neck: No JVD  CVS: nl s1s2, no s3  Pulm: CTA b/l  Abd: soft, mild TTP  Ext: No c/c/e  Neuro A&O x3  Psych: Normal affect      Labs:   14 May 2018 06:43    140    |  102    |  12.0   ----------------------------<  55     3.5     |  23.0   |  0.68     Ca    8.7        14 May 2018 06:43  Phos  2.3       14 May 2018 06:43  Mg     1.7       14 May 2018 06:43    TPro  5.3    /  Alb  2.3    /  TBili  0.4    /  DBili  0.1    /  AST  36     /  ALT  97     /  AlkPhos  204    14 May 2018 06:43                          10.3   7.0   )-----------( 195      ( 14 May 2018 06:43 )             32.3     EKG: SR with non-spec T-wave changes, unchanged    Assessment:  78yFemale PMHX CAD s/p CABG, HTN, HLD p/w abd pain and recurrent gallstone pancreatitis.  Patient feels better now s/p ERCP, for lap marsha.  Patient has been stable from CV perspective.  No evidence of CHF or ischemia.  Does >4 METs PTA without limitation.  No CV contraindication at mod CV risk.  EKG with T-wave changes, chronic and unchanged.    Plan:  1. Proceed for marsha  2. Periop monitor  3. Postop EKG  4. Continue BB  5. Hold statin with transaminitis  6. Hold ASA and restart per surgery

## 2018-05-14 NOTE — BRIEF OPERATIVE NOTE - PROCEDURE
<<-----Click on this checkbox to enter Procedure ERCP with calculus removal from biliary duct  05/14/2018    Active  [id number]  ERCP with sphincterotomy  05/14/2018    Active  [id number]

## 2018-05-14 NOTE — CONSULT NOTE ADULT - SUBJECTIVE AND OBJECTIVE BOX
79 y/o F admitted to medicine 5/11 for pancreatitis. Pt started having epigastric abd pain starting 5/9 radiating bilaterally with associated episodes of NBNB vomiting X3, low grade fevers. Pain began last September but worsened since February of this year and has these pains every couple of weeks resolving after a few days. States pain worse with lying down. Denies any ETOH abuse or ever taking steroids.  has a FH of pancreatic CA for her sister, Lung CA in father and Esophageal CA in mother. States 10lbs weight loss in last 3 months due to decreased appetite from pain. Otherwise since admission pt states voiding adequately, passing flatus    All other review of systems negative except per HPI. Denies CP or SOB    PAST MEDICAL & SURGICAL HISTORY:  Anxiety  Palpitations  Vaughn esophagus  GERD (gastroesophageal reflux disease)  PVC's (premature ventricular contractions)  Paroxysmal atrial fibrillation  Hypertension  Hyperlipidemia  S/P foot surgery, left  Status post endoscopy: 05/2017  History of open heart surgery    Home Medications:  ascorbic acid 500 mg oral tablet: 1 tab(s) orally once a day (28 Nov 2017 21:16)  aspirin 81 mg oral tablet, chewable: 1 tab(s) orally once a day (01 Dec 2017 08:40)  folic acid 1 mg oral tablet: 1 tab(s) orally once a day (28 Sep 2016 00:53)  hydroCHLOROthiazide 12.5 mg oral capsule: 1 cap(s) orally once a day (28 Nov 2017 22:07)  Cholordiaz, Lopressor, Rovustatin, Omeprazole, Lisinopril, Alprazolam    Allergies    No Known Allergies    Intolerances    SH: Denies toxic habits, [facility]  FH: Father-Lung CA, Mother-Esophageal CA, Sister Pancreatic CA    Vital Signs Last 24 Hrs  T(C): 36.8 (14 May 2018 07:35), Max: 36.8 (14 May 2018 00:03)  T(F): 98.3 (14 May 2018 07:35), Max: 98.3 (14 May 2018 07:35)  HR: 60 (14 May 2018 07:35) (55 - 60)  BP: 131/68 (14 May 2018 07:35) (119/61 - 146/62)  BP(mean): --  RR: 18 (14 May 2018 07:35) (18 - 18)  SpO2: 95% (14 May 2018 07:35) (93% - 95%)    NAD, AAOX3,wdwn  Head NCAT, EOMI, Sclera anicteric  Neck supple, no JVD  Chest expansion symmetric, hypertrophic scars from sternotomy- healed  Lungs CTAB  RRR, S1S2nl  Abd soft, ND, epigastric TTP, negative Gomez's sign, no rebound/guarding/rigidity  Ext: No swelling/edema    CBC Full  -  ( 14 May 2018 06:43 )  WBC Count : 7.0 K/uL  Hemoglobin : 10.3 g/dL  Hematocrit : 32.3 %  Platelet Count - Automated : 195 K/uL  Mean Cell Volume : 93.1 fl  Mean Cell Hemoglobin : 29.7 pg  Mean Cell Hemoglobin Concentration : 31.9 g/dL  Auto Neutrophil # : 5.7 K/uL  Auto Lymphocyte # : 0.7 K/uL  Auto Monocyte # : 0.4 K/uL  Auto Eosinophil # : 0.2 K/uL  Auto Basophil # : 0.0 K/uL  Auto Neutrophil % : 80.6 %  Auto Lymphocyte % : 9.8 %  Auto Monocyte % : 5.8 %  Auto Eosinophil % : 3.3 %  Auto Basophil % : 0.4 %    05-14    140  |  102  |  12.0  ----------------------------<  55<L>  3.5   |  23.0  |  0.68    Ca    8.7      14 May 2018 06:43  Phos  2.3     05-14  Mg     1.7     05-14    TPro  5.3<L>  /  Alb  2.3<L>  /  TBili  0.4  /  DBili  0.1  /  AST  36<H>  /  ALT  97<H>  /  AlkPhos  204<H>  05-14    RUQ U/S 5/12: Stones withoutcholecystitis, CBD 1.39cm, GB wall 0.4cm  CT A/P 5/11-Mild pancreatitis  MRCP 5/13, 5mm stone in distal CBD, CBD 8mm, 1.0cm splenule adjacent to pancreatic tale, 2.4cm exophytic renal mass
77 y/o F admitted to medicine 5/11 for pancreatitis. Pt started having epigastric abd pain starting 5/9 radiating bilaterally with associated episodes of vomiting X3, low grade fevers. Pain began last September but worsened since February of this year and has these pains every couple of weeks resolving after a few days. States pain worse with lying down. Denies any ETOH abuse or ever taking steroids.  has a FH of pancreatic CA for her sister, Lung CA in father and Esophageal CA in mother. States 10lbs weight loss in last 3 months due to decreased appetite from pain. Otherwise since admission pt states voiding adequately, passing flatus.  No bleeding.  I am asked to see her for mild anemia.    All other review of systems negative except per HPI. Denies CP or SOB    PAST MEDICAL & SURGICAL HISTORY:  Anxiety  Palpitations  Vaughn esophagus  GERD (gastroesophageal reflux disease)  PVC's (premature ventricular contractions)  Paroxysmal atrial fibrillation  Hypertension  Hyperlipidemia  S/P foot surgery, left  Status post endoscopy: 05/2017  History of open heart surgery    Home Medications:  ascorbic acid 500 mg oral tablet: 1 tab(s) orally once a day (28 Nov 2017 21:16)  aspirin 81 mg oral tablet, chewable: 1 tab(s) orally once a day (01 Dec 2017 08:40)  folic acid 1 mg oral tablet: 1 tab(s) orally once a day (28 Sep 2016 00:53)  hydroCHLOROthiazide 12.5 mg oral capsule: 1 cap(s) orally once a day (28 Nov 2017 22:07)  Cholordiaz, Lopressor, Rovustatin, Omeprazole, Lisinopril, Alprazolam    Allergies    No Known Allergies    Intolerances    SH: Denies toxic habits, plans to move out of [facility] in June  FH: Father-Lung CA, Mother-Esophageal CA, Sister Pancreatic CA    Vital Signs Last 24 Hrs  T(C): 36.8 (14 May 2018 07:35), Max: 36.8 (14 May 2018 00:03)  T(F): 98.3 (14 May 2018 07:35), Max: 98.3 (14 May 2018 07:35)  HR: 60 (14 May 2018 07:35) (55 - 60)  BP: 131/68 (14 May 2018 07:35) (119/61 - 146/62)  BP(mean): --  RR: 18 (14 May 2018 07:35) (18 - 18)  SpO2: 95% (14 May 2018 07:35) (93% - 95%)    NAD, AAOX3,wdwn  Head NCAT, EOMI, Sclera anicteric  Neck supple, no JVD  Chest expansion symmetric, hypertrophic scars from sternotomy- healed  Lungs Clear.  Heart: RR  Abd soft, non-tender and not distended.  No HSM is appreciable.  Ext: No swelling/edema    CBC Full  -  ( 14 May 2018 06:43 )  WBC Count : 7.0 K/uL  Hemoglobin : 10.3 g/dL  Hematocrit : 32.3 %  Platelet Count - Automated : 195 K/uL  Mean Cell Volume : 93.1 fl  Mean Cell Hemoglobin : 29.7 pg  Mean Cell Hemoglobin Concentration : 31.9 g/dL  Auto Neutrophil # : 5.7 K/uL  Auto Lymphocyte # : 0.7 K/uL  Auto Monocyte # : 0.4 K/uL  Auto Eosinophil # : 0.2 K/uL  Auto Basophil # : 0.0 K/uL  Auto Neutrophil % : 80.6 %  Auto Lymphocyte % : 9.8 %  Auto Monocyte % : 5.8 %  Auto Eosinophil % : 3.3 %  Auto Basophil % : 0.4 %    05-14    140  |  102  |  12.0  ----------------------------<  55<L>  3.5   |  23.0  |  0.68    Ca    8.7      14 May 2018 06:43  Phos  2.3     05-14  Mg     1.7     05-14    TPro  5.3<L>  /  Alb  2.3<L>  /  TBili  0.4  /  DBili  0.1  /  AST  36<H>  /  ALT  97<H>  /  AlkPhos  204<H>  05-14      MRCP 5/13, 5mm stone in distal CBD, CBD 8mm, 1.0cm splenule adjacent to pancreatic tale, 2.4 cm exophytic renal mass on the left.    Assessment and Recommendation:   		  77 y/o F with gallstone pancreatitis and choledocholithiasis, non- toxic, no overt bleeding.  Anemia of inflammation.  Concern  for left renal  neoplasm.  -ERCP per GI today  -Urology per management/followup of renal mass, will need surgery for it after pancreatitis resolves.  She is scheduled for a cholecystectomy.    Thank you.
INTERVAL HPI/OVERNIGHT EVENTS:  this is a 78 year old lady 2 years sp cardiac bypass surgery who has had a few months worth of intermittent sharp epigastric pain radiating to the back. Recently it has become more constant. Her PCP has been treating her for gastritis.  CT shows pancreatic mass, pancreatitis and renal mass.    During this time, she has become constipated due to not eating as much due to pain.    she has some urinary leakage with coughing and sneezing.  She has no blood in the urine.  she has many UTIs.    she has had a previous hysterectomy for fibroids.    she has no personal history of cancer, but has family history of breast and pancreatic cancer.    She knows about her renal mass, but her PCP told her that due to its size, it was unlikely to grown and spread.    when not in the hospital, she goes up and down the stairs without shortness of breath, and goes out and does things.  She does not spend daylight hours in bed.    she has been having diarrhea since the CT scan, it is becoming slightly liquid,    MEDICATIONS  (STANDING):  ertapenem  IVPB      lactated ringers. 1000 milliLiter(s) (200 mL/Hr) IV Continuous <Continuous>  pantoprazole  Injectable 40 milliGRAM(s) IV Push two times a day  saccharomyces boulardii 250 milliGRAM(s) Oral two times a day    MEDICATIONS  (PRN):  acetaminophen   Tablet. 650 milliGRAM(s) Oral every 6 hours PRN Mild Pain (1 - 3)  oxyCODONE    IR 5 milliGRAM(s) Oral every 6 hours PRN Moderate Pain (4 - 6)      Vital Signs Last 24 Hrs  T(C): 36.9 (12 May 2018 08:13), Max: 38.6 (11 May 2018 21:29)  T(F): 98.5 (12 May 2018 08:13), Max: 101.4 (11 May 2018 21:29)  HR: 68 (12 May 2018 14:55) (65 - 76)  BP: 127/59 (12 May 2018 14:55) (107/57 - 127/59)  BP(mean): --  RR: 20 (12 May 2018 14:55) (20 - 20)  SpO2: 96% (12 May 2018 14:55) (92% - 96%)    PHYSICAL EXAM:    Gen: NAD  abdomen: soft, tender  Extremities: warm        I&O's Detail      LABS:                        10.6   10.4  )-----------( 219      ( 12 May 2018 10:14 )             33.4     -    142  |  103  |  16.0  ----------------------------<  98  3.3<L>   |  27.0  |  0.89    Ca    8.3<L>      12 May 2018 10:14    TPro  5.5<L>  /  Alb  3.0<L>  /  TBili  0.7  /  DBili  x   /  AST  215<H>  /  ALT  247<H>  /  AlkPhos  252<H>        Urinalysis Basic - ( 11 May 2018 17:59 )    Color: Yellow / Appearance: Clear / S.020 / pH: x  Gluc: x / Ketone: Trace  / Bili: Negative / Urobili: 8 mg/dL   Blood: x / Protein: 30 mg/dL / Nitrite: Negative   Leuk Esterase: Moderate / RBC: 3-5 /HPF / WBC 6-10   Sq Epi: x / Non Sq Epi: Few / Bacteria: Few        RADIOLOGY & ADDITIONAL STUDIES:
Maribell Wilson M.D., F.A.C.C.                                                                                            [facility]                                                                                                   [address] [place], [place] [id number] [phone]      The patient is a 78y Female whose cardiac risk factors include   hypertension and hyperlipidemia  The pt is followed by Dr. Hodge  The pt tells me that approx 2 years ago she underwent a cardiac evaluation prior to undergoing back surgery. This culminated in CABG surgery (she never had her back operated on)  She is normally fine without complaints of chest pain, SOB, PND, orthopnea, palpitations or syncope  She is now admitted for a work up of abdominal pain  She has acute pancreatitis and has a kidney lesion which may be a renal cell CA  I was asked to see her for LICMA    Allergies    No Known Allergies    Intolerances      MEDICATIONS  (STANDING):    As an out pt she is on   Metoprolol 25 mg po bid  ASA three times a week  Crestor  Eye drops    IN HOSPITAL  ertapenem  IVPB      ertapenem  IVPB 1000 milliGRAM(s) IV Intermittent every 24 hours  lactated ringers. 1000 milliLiter(s) (200 mL/Hr) IV Continuous <Continuous>  pantoprazole  Injectable 40 milliGRAM(s) IV Push two times a day  saccharomyces boulardii 250 milliGRAM(s) Oral two times a day    MEDICATIONS  (PRN):  acetaminophen   Tablet 650 milliGRAM(s) Oral every 6 hours PRN For Temp greater than 38 C (100.4 F)  acetaminophen   Tablet. 650 milliGRAM(s) Oral every 6 hours PRN Mild Pain (1 - 3)  oxyCODONE    IR 5 milliGRAM(s) Oral every 6 hours PRN Moderate Pain (4 - 6)      Acute Infectious Diseases:  No history of rheumatic fever or TB.    PAST MEDICAL & SURGICAL HISTORY:  S/P foot surgery, left  Status post endoscopy: 05/2017  History of open heart surgery  Hysterectomy    PAST MEDICAL & SURGICAL HISTORY:  Anxiety  Palpitations  Vaughn esophagus  GERD (gastroesophageal reflux disease)  PVC's (premature ventricular contractions)  Paroxysmal atrial fibrillation  Hypertension  Hyperlipidemia      Habits:  [   ] Cigarettes:  NONE  [   ] Alcohol:  [   ] Drugs:    Social History:  [   ]    [   ]  Single  [   ]  /Separate  [   ]  Children  [   ]   Job    Family History of Heart Disease:  POSITIVE     Vital Signs Last 24 Hrs  T(C): 36.4 (13 May 2018 07:30), Max: 38 (12 May 2018 23:17)  T(F): 97.5 (13 May 2018 07:30), Max: 100.4 (12 May 2018 23:17)  HR: 53 (13 May 2018 07:30) (47 - 68)  BP: 109/61 (13 May 2018 07:30) (109/61 - 133/70)  BP(mean): --  RR: 19 (13 May 2018 07:30) (18 - 20)  SpO2: 94% (13 May 2018 07:30) (92% - 98%)    I&O's Detail      Constitutional:    NC/AT:     HEENT: Normal    Neck:  No JVD, bruits or thyromegaly    Respiratory:  Clear without rales or rhonchi    Cardiovascular:  RR with  2/6 syst murmur, rub or gallop.    Gastrointestinal: Soft -- slightly tender.    Extremities: without cyanosis, clubbing or edema.    Neurological:  Oriented   x     d . No gross sensory or motor defects.    Skin: Normal    Psychiatric: Normal affect.      	                        9.9    9.9   )-----------( 195      ( 13 May 2018 07:02 )             31.7     05-13    144  |  105  |  14.0  ----------------------------<  71  4.0   |  26.0  |  0.79    Ca    8.6      13 May 2018 07:02  Phos  2.4     05-13  Mg     1.4     05-13    TPro  5.2<L>  /  Alb  2.6<L>  /  TBili  0.4  /  DBili  x   /  AST  65<H>  /  ALT  139<H>  /  AlkPhos  197<H>  05-13    	          EKG:   NSR. .  borderline RAD. Mini Qs in 2,3,F.  T wave inversions in V2-6 ? Lead placement      IMPRESSION:    Renal mass  Pancreatitis  CABG  Hypertension  Hyperlipidemia                                 At this I would recommend continuing her Metoprolol and ASA (if OK with surgery)  CXR and repeat EKG ordered  Need old records from [facility]  Hold statin given elevated LFTS  At present I would consider her current condition stable and acceptable for surgery at increased risk, but final decision re: clearance as per [facility]      Thank you for referring DENNIS BROWN.  I will be happy to follow with you.
HPI:  78y Female with PMH of HTN, HLD, Gastritis, ?paroxysmal Afib, s/p CABG in 2016, presenting with upper abdominal pain that acutely on Thursday night after eating some chicken for dinner. Patient states the pain was severe, 10/10 at times, located in the epigastric region, radiating to the upper back, without any relieving factors, and was associated with 3 episodes of vomiting unknown colour. Patient states she was ultimately able to fall asleep but the pain continued when the patient woke up prompting the patient to come to the ED, where she subsequently had another episode of vomiting. Of note patient states she has had similar epigastric pain since fall of last year and she follows with Dr. Fonseca (GI), patient further had endoscopy 2 weeks ago and was diagnosed with gastritis.    Patient has history of epigastric pain and has known cholelithiasis. She had episode of fever in ER. Still has abdominal pain. She was noted to have biliary ductal dilation on US abdomen. No CBD stone on US and CT abdomen.     No previous history of pancreatitis.     PAST MEDICAL & SURGICAL HISTORY:  Anxiety  Palpitations  Vaughn esophagus  GERD (gastroesophageal reflux disease)  PVC's (premature ventricular contractions)  Paroxysmal atrial fibrillation  Hypertension  Hyperlipidemia  S/P foot surgery, left  Status post endoscopy: 2017  History of open heart surgery      ROS:  No Heartburn, regurgitation, dysphagia, odynophagia.  No dyspepsia  + abdominal pain.    +Nausea, vomiting.  No Bleeding.  No hematemesis.   No diarrhea.    No hematochezia  No weight loss, anorexia.  No edema.      MEDICATIONS  (STANDING):  ertapenem  IVPB      lactated ringers. 1000 milliLiter(s) (200 mL/Hr) IV Continuous <Continuous>  pantoprazole  Injectable 40 milliGRAM(s) IV Push two times a day  saccharomyces boulardii 250 milliGRAM(s) Oral two times a day    MEDICATIONS  (PRN):  acetaminophen   Tablet. 650 milliGRAM(s) Oral every 6 hours PRN Mild Pain (1 - 3)  oxyCODONE    IR 5 milliGRAM(s) Oral every 6 hours PRN Moderate Pain (4 - 6)      Allergies    No Known Allergies    Intolerances        SOCIAL HISTORY:Denies x 3    ENDOSCOPIC/GI HISTORY:Recent EGD and remote colonoscopy    FAMILY HISTORY:  Family history of esophageal cancer (Mother)  Family history of hypertension (Mother)  Family history of heart disease (Father)  Family history of diabetes mellitus (Father)  Sister has history of pancreatic cancer      Vital Signs Last 24 Hrs  T(C): 36.9 (12 May 2018 08:13), Max: 38.6 (11 May 2018 21:29)  T(F): 98.5 (12 May 2018 08:13), Max: 101.4 (11 May 2018 21:29)  HR: 67 (12 May 2018 08:13) (65 - 77)  BP: 125/58 (12 May 2018 08:13) (107/57 - 125/58)  BP(mean): --  RR: 20 (12 May 2018 08:13) (20 - 20)  SpO2: 94% (12 May 2018 08:13) (92% - 94%)    PHYSICAL EXAM:    GENERAL: NAD, well-groomed, well-developed  HEAD:  Atraumatic, Normocephalic  EYES: EOMI, PERRLA, conjunctiva and sclera clear  ENMT: No tonsillar erythema, exudates, or enlargement; Moist mucous membranes, Good dentition, No lesions  NECK: Supple, No JVD, Normal thyroid  CHEST/LUNG: Clear to percussion bilaterally; No rales, rhonchi, wheezing, or rubs  HEART: Regular rate and rhythm; No murmurs, rubs, or gallops  ABDOMEN: Soft, tender, Nondistended; Bowel sounds present  EXTREMITIES:  2+ Peripheral Pulses, No clubbing, cyanosis, or edema  LYMPH: No lymphadenopathy noted  SKIN: No rashes or lesions      LABS:                        10.6   10.4  )-----------( 219      ( 12 May 2018 10:14 )             33.4     05-    142  |  103  |  16.0  ----------------------------<  98  3.3<L>   |  27.0  |  0.89    Ca    8.3<L>      12 May 2018 10:14    TPro  5.5<L>  /  Alb  3.0<L>  /  TBili  0.7  /  DBili  x   /  AST  215<H>  /  ALT  247<H>  /  AlkPhos  252<H>  -12       Urinalysis Basic - ( 11 May 2018 17:59 )    Color: Yellow / Appearance: Clear / S.020 / pH: x  Gluc: x / Ketone: Trace  / Bili: Negative / Urobili: 8 mg/dL   Blood: x / Protein: 30 mg/dL / Nitrite: Negative   Leuk Esterase: Moderate / RBC: 3-5 /HPF / WBC 6-10   Sq Epi: x / Non Sq Epi: Few / Bacteria: Few    Prothrombin Time and INR, Plasma (17 @ 19:45)    Prothrombin Time, Plasma: 10.6: Effective , the reference range for PT has changed. sec    INR: 0.96: RECOMMENDED RANGES FOR THERAPEUTIC INR:    2.0-3.0 for most medical and surgical thromboembolic states    2.0-3.0 for atrial fibrillation    2.0-3.0 for bileaflet mechanical valve in aortic position    2.5-3.5 for mechanical heart valves   Chest 2004;126:[id number]  The presence of direct thrombin inhibitors (argatroban, refludan)  may falsely increase results. ratio        LIVER FUNCTIONS - ( 12 May 2018 10:14 )  Alb: 3.0 g/dL / Pro: 5.5 g/dL / ALK PHOS: 252 U/L / ALT: 247 U/L / AST: 215 U/L / GGT: x           Lipase, Serum (18 @ 15:33)    Lipase, Serum: 1328 U/L          RADIOLOGY & ADDITIONAL STUDIES:< from: US Abdomen Limited (18 @ 07:17) >    FINDINGS:    Liver: Within normal limits.    Bile ducts: Dilated common bile duct measuring up to 1.3 cm. Distal   common bile duct is not well visualized secondary to bowel gas. Mild   intrahepatic biliary dilatation.    Gallbladder: Contracted. Gallstones.        Pancreas: Visualized portions are within normal limits.    Right kidney: 11 cm. No hydronephrosis. Increased echogenicity of the   renal parenchyma parenchymal thinning compatible medical renal disease.   Subcentimeter right renal cyst.    Ascites: None.    IVC: Visualized portions are within normal limits.    IMPRESSION:     Dilatation of the common bile duct with mild intrahepatic biliary   dilatation as seen on recent CT.    < end of copied text >  < from: CT Abdomen and Pelvis w/ Oral Cont and w/ IV Cont (18 @ 19:56) >  FINDINGS:    LOWER CHEST: Visualized lung bases, heart and pericardium are   unremarkable.    LIVER: Within normal limits.  SPLEEN: Within normal limits.  PANCREAS: 1.4 cm solid slightly hyperdense nodule in the tail of the   pancreas. No pancreatic ductal dilatation. Mild peripancreatic   inflammatory changes. No necrosis or peripancreatic fluid collection.  GALLBLADDER: Within normal limits.  BILE DUCTS: Mild intra and extra hepatic biliary ductal dilatation, new   from prior examinations, with the common duct measuring 1.5 cm. No   visible stones seen along the course of the duct.  ADRENALS: Within normal limits.  KIDNEYS/URETERS: 2.5 cm solid enhancing mass in the left kidney, not   significantly changed in size. Subcentimeter bilateral renal lesions, too   small to characterize.    RETROPERITONEUM: No lymphadenopathy.    VESSELS:  Normal caliber aorta. Patent left renal vein and inferior vena   cava.     BOWEL: No bowel obstruction, wall thickening or inflammatory change.   Appendix normal.  PERITONEUM: No ascites or pneumoperitoneum.    REPRODUCTIVE ORGANS: Hysterectomy. Normal ovaries.  BLADDER: Within normal limits.    ABDOMINAL WALL: Within normal limits.  BONES: No acute bony abnormality. Sternotomy.    IMPRESSION:     Mild acute pancreatitis.    New biliary ductal dilatation  as compared with 2018.   Recommend MRCP to evaluate for choledocholithiasis.    No change in solid enhancing left renal mass suspicious for renal cell   carcinoma.    1.3 cm solid nodule in the pancreatic tail; differential diagnosis   includes neuroendocrine tumor and accessory spleen. Recommend further   evaluation with contrast-enhanced MRI and/or nuclear medicine damaged red   blood cell or liver spleen scan.    < end of copied text >

## 2018-05-14 NOTE — PROGRESS NOTE ADULT - ASSESSMENT
78y Female with PMH of HTN, HLD, Gastritis, ?paroxysmal Afib, s/p bypass surgery in 2016, presenting with upper abdominal pain that acutely on Thursday night after eating, found to be febrile here in the ED with lipase of 1300 and CT scan showing mild pancreatitis, mass in the pancreatic tail and mass in the kidney suggestive of possible renal cell carcinoma. Patient now on IV Invanz with downtrending lipase, MRI showing Distal common bile duct stone results in mild intrahepatic and extrahepatic biliary ductal dilatation, consistent with  interstitial edematous pancreatitis.

## 2018-05-14 NOTE — CHART NOTE - NSCHARTNOTEFT_GEN_A_CORE
Notified that patient gets bradycardic while sleeping or resting. Pt says she is bradycardic at baseline. Held dose of Metoprolol 25mg tonight. Pt noted to actually only take half tab of Metoprolol 25mg. So will restart Metoprolol 12.5mg starting tomorrow to allow improvement in bradycardia. Will observe and contact cardio if worsens.

## 2018-05-14 NOTE — CONSULT NOTE ADULT - CONSULT REASON
Anemia  Left renal mass.
Cardiac evaluation
renal mass
gallstone pancreatitis
Biliary pancreatitis   Pancreatic mass

## 2018-05-14 NOTE — PROGRESS NOTE ADULT - SUBJECTIVE AND OBJECTIVE BOX
CC: Patient is a 78y old  Female who presents with a chief complaint of Abdominal pain admitted for acute pancreatitis (13 May 2018 16:15)    Patient seen and examined at bedside, No acute overnight events. Patient still not tolerating PO diet. States she feel nauseas this AM with associated epigastric pain, similar to admission, no episodes fo vomiting overnight.   Patient minimally ambulating, voiding and last BM prior to admission 3 days ago.  Cardiac monitor reviewed - sinus bradycardia with HR in high 50s    ROS: Denies fever, chest pain, SOB, diarrhea, constipation, calf pain.    VS:   Vital Signs Last 24 Hrs  T(C): 36.8 (14 May 2018 07:35), Max: 36.8 (14 May 2018 00:03)  T(F): 98.3 (14 May 2018 07:35), Max: 98.3 (14 May 2018 07:35)  HR: 60 (14 May 2018 07:35) (55 - 60)  BP: 131/68 (14 May 2018 07:35) (119/61 - 146/62)  RR: 18 (14 May 2018 07:35) (18 - 18)  SpO2: 95% (14 May 2018 07:35) (93% - 95%)      Physical Exam:   Gen: pleasant elderly female, laying in bed, NAD  HEENT: NCAT, EOMI, PERRLA, Pupils 3mm to 2mm, moist mucous membranes  CVS: RRR, +S1/S2, no murmurs, rubs or gallops appreciated  Lungs: CTAB, no wheeze, rales, rhonchi  Abdomen: +BS, soft, ND, mild tenderness to epigastrium. no palpable flank tenderness or mass, no CVA tenderness  Ext: No cyanosis, edema or calf tenderness. 2+DP pulses b/l.   Neuro: AAOx3, no focal deficit    Labs:                        10.3   7.0   )-----------( 195      ( 14 May 2018 06:43 )             32.3   05-14    140  |  102  |  12.0  ----------------------------<  55<L>  3.5   |  23.0  |  0.68    Ca    8.7      14 May 2018 06:43  Phos  2.3     05-14  Mg     1.7     05-14    TPro  5.2<L>  /  Alb  2.6<L>  /  TBili  0.4  /  DBili  x   /  AST  65<H>  /  ALT  139<H>  /  AlkPhos  197<H>  05-13 05-13 @ 07:01  -  05-14 @ 07:00  --------------------------------------------------------  IN: 2000 mL / OUT: 0 mL / NET: 2000 mL        Radiology:    Radiology:  < from: US Abdomen Limited (05.12.18 @ 07:17) >    INTERPRETATION:  CLINICAL INFORMATION: Not eating    COMPARISON: None available.    TECHNIQUE: Sonography of the right upper quadrant.     FINDINGS:    Liver: Within normal limits.    Bile ducts: Dilated common bile duct measuring up to 1.3 cm. Distal   common bile duct is not well visualized secondary to bowel gas. Mild   intrahepatic biliary dilatation.    Gallbladder: Contracted. Gallstones.        Pancreas: Visualized portions are within normal limits.    Right kidney: 11 cm. No hydronephrosis. Increased echogenicity of the   renal parenchyma parenchymal thinning compatible medical renal disease.   Subcentimeter right renal cyst.    Ascites: None.    IVC: Visualized portions are within normal limits.    IMPRESSION:     Dilatation of the common bile duct with mild intrahepatic biliary   dilatation as seen on recent CT.    < from: MR Abdomen w/ IV Cont (05.13.18 @ 16:23) >  IMPRESSION:     Distal common bile duct stone results in mild intrahepatic and   extrahepatic biliary ductal dilatation.    Gallstones.    Findings compatible with interstitial edematous pancreatitis.    Enhancing left renal lesion compatible with a renal neoplasm.           Medications:  MEDICATIONS  (STANDING):  ertapenem  IVPB      ertapenem  IVPB 1000 milliGRAM(s) IV Intermittent every 24 hours  lactated ringers. 1000 milliLiter(s) (200 mL/Hr) IV Continuous <Continuous>  metoprolol tartrate 25 milliGRAM(s) Oral two times a day  pantoprazole  Injectable 40 milliGRAM(s) IV Push two times a day  saccharomyces boulardii 250 milliGRAM(s) Oral two times a day    MEDICATIONS  (PRN):  acetaminophen   Tablet 650 milliGRAM(s) Oral every 6 hours PRN For Temp greater than 38 C (100.4 F)  acetaminophen   Tablet. 650 milliGRAM(s) Oral every 6 hours PRN Mild Pain (1 - 3)  oxyCODONE    IR 5 milliGRAM(s) Oral every 6 hours PRN Moderate Pain (4 - 6)

## 2018-05-14 NOTE — CONSULT NOTE ADULT - ASSESSMENT
77 y/o F with gallstone pancreatitis and choledocholithiasis, nonotoxic, HD nl without stigmata of cholecystitis/cholangitis, clinically well and improving  -ERCP per GI today  -Will discuss with Dr. Calhoun and pt regarding cholecystectomy  -Urology per management/followup of renal mass  -Cardiology/medicine for optimization before possible surgical intervention  -Pain control, monitor VS, PO intake as tolerated, STRICT Intake/[place],
Patient with acute biliary pancreatitis, pancreatic mass and renal mass    1. IV hydration  2. Obtain MRI with MRCP to rule out pancreatic mass vs splenule  3. Continue antibiotics  4. Cardiology evaluation as patient may need ERCP/EUS depending upon MRCP/MRI  5. Surgical evaluation for possible cholecystectomy after acute pancreatitis is better  6. PPI bid  7. NPO at this time
This is a  78 year old lady with a relatively good baseline functional status admitted with pancreatitis.    Renal Mass:  She has a left sided 2.5 cm exophytic posterior incidentally found renal mass.  we explained that 80% of solid renal masses are cancer.  We explained that solid masses <3cm are unlikely to metastasize.    we discussed treatment options for renal cell carcinoma including:  open radical nephrectomy,  robotic partial nephrectomy  percutaneous cryo or RF ablation   and continued watching with reimaging.    There is no need for urgent intervention in this setting due to her other intra-abdominal issues.    We discussed that there are preventative measures for recurrent UTIs that we can talk about in the office.    we will follow along.

## 2018-05-14 NOTE — CONSULT NOTE ADULT - ATTENDING COMMENTS
Gallstone pancreatitis with resolution of pancreatitis symptoms and s/p ERCP with successful removal of CBD stone. No epigastric t enderness. Plan for Lap marsha tomorrow 5/15/18

## 2018-05-15 ENCOUNTER — RESULT REVIEW (OUTPATIENT)
Age: 78
End: 2018-05-15

## 2018-05-15 DIAGNOSIS — K80.50 CALCULUS OF BILE DUCT WITHOUT CHOLANGITIS OR CHOLECYSTITIS WITHOUT OBSTRUCTION: ICD-10-CM

## 2018-05-15 DIAGNOSIS — K85.10 BILIARY ACUTE PANCREATITIS WITHOUT NECROSIS OR INFECTION: ICD-10-CM

## 2018-05-15 LAB
ALBUMIN SERPL ELPH-MCNC: 2.3 G/DL — LOW (ref 3.3–5.2)
ALP SERPL-CCNC: 155 U/L — HIGH (ref 40–120)
ALT FLD-CCNC: 66 U/L — HIGH
ANION GAP SERPL CALC-SCNC: 8 MMOL/L — SIGNIFICANT CHANGE UP (ref 5–17)
APTT BLD: 30.3 SEC — SIGNIFICANT CHANGE UP (ref 27.5–37.4)
AST SERPL-CCNC: 19 U/L — SIGNIFICANT CHANGE UP
BASOPHILS # BLD AUTO: 0 K/UL — SIGNIFICANT CHANGE UP (ref 0–0.2)
BASOPHILS NFR BLD AUTO: 0.2 % — SIGNIFICANT CHANGE UP (ref 0–2)
BILIRUB SERPL-MCNC: 0.3 MG/DL — LOW (ref 0.4–2)
BUN SERPL-MCNC: 12 MG/DL — SIGNIFICANT CHANGE UP (ref 8–20)
CALCIUM SERPL-MCNC: 8.6 MG/DL — SIGNIFICANT CHANGE UP (ref 8.6–10.2)
CHLORIDE SERPL-SCNC: 103 MMOL/L — SIGNIFICANT CHANGE UP (ref 98–107)
CO2 SERPL-SCNC: 28 MMOL/L — SIGNIFICANT CHANGE UP (ref 22–29)
CREAT SERPL-MCNC: 0.73 MG/DL — SIGNIFICANT CHANGE UP (ref 0.5–1.3)
EOSINOPHIL # BLD AUTO: 0.2 K/UL — SIGNIFICANT CHANGE UP (ref 0–0.5)
EOSINOPHIL NFR BLD AUTO: 4.6 % — SIGNIFICANT CHANGE UP (ref 0–6)
GLUCOSE SERPL-MCNC: 104 MG/DL — SIGNIFICANT CHANGE UP (ref 70–115)
HCT VFR BLD CALC: 29 % — LOW (ref 37–47)
HGB BLD-MCNC: 9.4 G/DL — LOW (ref 12–16)
INR BLD: 1.05 RATIO — SIGNIFICANT CHANGE UP (ref 0.88–1.16)
LYMPHOCYTES # BLD AUTO: 0.9 K/UL — LOW (ref 1–4.8)
LYMPHOCYTES # BLD AUTO: 19.6 % — LOW (ref 20–55)
MCHC RBC-ENTMCNC: 29.5 PG — SIGNIFICANT CHANGE UP (ref 27–31)
MCHC RBC-ENTMCNC: 32.4 G/DL — SIGNIFICANT CHANGE UP (ref 32–36)
MCV RBC AUTO: 90.9 FL — SIGNIFICANT CHANGE UP (ref 81–99)
MONOCYTES # BLD AUTO: 0.3 K/UL — SIGNIFICANT CHANGE UP (ref 0–0.8)
MONOCYTES NFR BLD AUTO: 6.6 % — SIGNIFICANT CHANGE UP (ref 3–10)
NEUTROPHILS # BLD AUTO: 3.1 K/UL — SIGNIFICANT CHANGE UP (ref 1.8–8)
NEUTROPHILS NFR BLD AUTO: 68.8 % — SIGNIFICANT CHANGE UP (ref 37–73)
PLATELET # BLD AUTO: 199 K/UL — SIGNIFICANT CHANGE UP (ref 150–400)
POTASSIUM SERPL-MCNC: 3.8 MMOL/L — SIGNIFICANT CHANGE UP (ref 3.5–5.3)
POTASSIUM SERPL-SCNC: 3.8 MMOL/L — SIGNIFICANT CHANGE UP (ref 3.5–5.3)
PROT SERPL-MCNC: 4.7 G/DL — LOW (ref 6.6–8.7)
PROTHROM AB SERPL-ACNC: 11.6 SEC — SIGNIFICANT CHANGE UP (ref 9.8–12.7)
RBC # BLD: 3.19 M/UL — LOW (ref 4.4–5.2)
RBC # FLD: 12.7 % — SIGNIFICANT CHANGE UP (ref 11–15.6)
SODIUM SERPL-SCNC: 139 MMOL/L — SIGNIFICANT CHANGE UP (ref 135–145)
TYPE + AB SCN PNL BLD: SIGNIFICANT CHANGE UP
WBC # BLD: 4.5 K/UL — LOW (ref 4.8–10.8)
WBC # FLD AUTO: 4.5 K/UL — LOW (ref 4.8–10.8)

## 2018-05-15 PROCEDURE — 47562 LAPAROSCOPIC CHOLECYSTECTOMY: CPT

## 2018-05-15 PROCEDURE — 93010 ELECTROCARDIOGRAM REPORT: CPT

## 2018-05-15 PROCEDURE — 99232 SBSQ HOSP IP/OBS MODERATE 35: CPT

## 2018-05-15 PROCEDURE — 88304 TISSUE EXAM BY PATHOLOGIST: CPT | Mod: 26

## 2018-05-15 PROCEDURE — 99233 SBSQ HOSP IP/OBS HIGH 50: CPT

## 2018-05-15 PROCEDURE — 99233 SBSQ HOSP IP/OBS HIGH 50: CPT | Mod: GC

## 2018-05-15 RX ORDER — FENTANYL CITRATE 50 UG/ML
50 INJECTION INTRAVENOUS
Qty: 0 | Refills: 0 | Status: DISCONTINUED | OUTPATIENT
Start: 2018-05-15 | End: 2018-05-15

## 2018-05-15 RX ORDER — SODIUM CHLORIDE 9 MG/ML
1000 INJECTION, SOLUTION INTRAVENOUS
Qty: 0 | Refills: 0 | Status: DISCONTINUED | OUTPATIENT
Start: 2018-05-15 | End: 2018-05-15

## 2018-05-15 RX ORDER — ONDANSETRON 8 MG/1
4 TABLET, FILM COATED ORAL ONCE
Qty: 0 | Refills: 0 | Status: DISCONTINUED | OUTPATIENT
Start: 2018-05-15 | End: 2018-05-15

## 2018-05-15 RX ORDER — ASPIRIN/CALCIUM CARB/MAGNESIUM 324 MG
81 TABLET ORAL DAILY
Qty: 0 | Refills: 0 | Status: DISCONTINUED | OUTPATIENT
Start: 2018-05-15 | End: 2018-05-17

## 2018-05-15 RX ORDER — FENTANYL CITRATE 50 UG/ML
25 INJECTION INTRAVENOUS
Qty: 0 | Refills: 0 | Status: DISCONTINUED | OUTPATIENT
Start: 2018-05-15 | End: 2018-05-15

## 2018-05-15 RX ORDER — OXYCODONE HYDROCHLORIDE 5 MG/1
5 TABLET ORAL EVERY 6 HOURS
Qty: 0 | Refills: 0 | Status: DISCONTINUED | OUTPATIENT
Start: 2018-05-15 | End: 2018-05-17

## 2018-05-15 RX ORDER — METOCLOPRAMIDE HCL 10 MG
10 TABLET ORAL ONCE
Qty: 0 | Refills: 0 | Status: DISCONTINUED | OUTPATIENT
Start: 2018-05-15 | End: 2018-05-15

## 2018-05-15 RX ORDER — ENOXAPARIN SODIUM 100 MG/ML
40 INJECTION SUBCUTANEOUS DAILY
Qty: 0 | Refills: 0 | Status: DISCONTINUED | OUTPATIENT
Start: 2018-05-15 | End: 2018-05-17

## 2018-05-15 RX ORDER — SODIUM CHLORIDE 9 MG/ML
1000 INJECTION, SOLUTION INTRAVENOUS
Qty: 0 | Refills: 0 | Status: DISCONTINUED | OUTPATIENT
Start: 2018-05-15 | End: 2018-05-16

## 2018-05-15 RX ORDER — METOPROLOL TARTRATE 50 MG
25 TABLET ORAL
Qty: 0 | Refills: 0 | Status: DISCONTINUED | OUTPATIENT
Start: 2018-05-15 | End: 2018-05-15

## 2018-05-15 RX ORDER — SACCHAROMYCES BOULARDII 250 MG
250 POWDER IN PACKET (EA) ORAL
Qty: 0 | Refills: 0 | Status: DISCONTINUED | OUTPATIENT
Start: 2018-05-15 | End: 2018-05-16

## 2018-05-15 RX ORDER — ACETAMINOPHEN 500 MG
650 TABLET ORAL EVERY 6 HOURS
Qty: 0 | Refills: 0 | Status: DISCONTINUED | OUTPATIENT
Start: 2018-05-15 | End: 2018-05-17

## 2018-05-15 RX ORDER — PANTOPRAZOLE SODIUM 20 MG/1
40 TABLET, DELAYED RELEASE ORAL
Qty: 0 | Refills: 0 | Status: DISCONTINUED | OUTPATIENT
Start: 2018-05-15 | End: 2018-05-17

## 2018-05-15 RX ADMIN — Medication 250 MILLIGRAM(S): at 05:26

## 2018-05-15 RX ADMIN — SODIUM CHLORIDE 150 MILLILITER(S): 9 INJECTION, SOLUTION INTRAVENOUS at 02:26

## 2018-05-15 RX ADMIN — OXYCODONE HYDROCHLORIDE 5 MILLIGRAM(S): 5 TABLET ORAL at 21:21

## 2018-05-15 RX ADMIN — SODIUM CHLORIDE 64 MILLILITER(S): 9 INJECTION, SOLUTION INTRAVENOUS at 19:02

## 2018-05-15 RX ADMIN — PANTOPRAZOLE SODIUM 40 MILLIGRAM(S): 20 TABLET, DELAYED RELEASE ORAL at 05:28

## 2018-05-15 RX ADMIN — Medication 250 MILLIGRAM(S): at 19:02

## 2018-05-15 RX ADMIN — OXYCODONE HYDROCHLORIDE 5 MILLIGRAM(S): 5 TABLET ORAL at 22:05

## 2018-05-15 RX ADMIN — ERTAPENEM SODIUM 120 MILLIGRAM(S): 1 INJECTION, POWDER, LYOPHILIZED, FOR SOLUTION INTRAMUSCULAR; INTRAVENOUS at 05:27

## 2018-05-15 RX ADMIN — ENOXAPARIN SODIUM 40 MILLIGRAM(S): 100 INJECTION SUBCUTANEOUS at 21:21

## 2018-05-15 NOTE — CHART NOTE - NSCHARTNOTEFT_GEN_A_CORE
Nurse called to mention that pt was w bradycardia while awake in the 38-45 bpm; asymptomatic.  I evaluated pt. and she was asymptomatic. Nurse called to mention that pt was w bradycardia while awake in the 38-45 bpm; asymptomatic.  I evaluated pt. and she was asymptomatic.  BP: 146/72 mmHg.  EKG was done and showed sinus viktor w a premature atrial beat but no blocks.  Medications were reviewed and pt has NOT received any metoprolol while in the hospital.  Pt. has received Zofran today which may have contributed to pt.s prolonged QTc on EKG.  Labs: magnesium was mildly low and was supplemented.  Troponin negative.  BMP and phosphorous were WNL.  Pt. to be transferred to Telemetry.  Will place re-consult w Cardiology in the A.M. for evaluation as patient may need a pacemaker since patient is sustaining in the 30's low 40's.

## 2018-05-15 NOTE — BRIEF OPERATIVE NOTE - COMMENTS
See above. IVF and clear liquids. If OK tonight may proceed with cholecystectomy tomorrow.
wound class II

## 2018-05-15 NOTE — PROGRESS NOTE ADULT - SUBJECTIVE AND OBJECTIVE BOX
CC: Patient is a 78y old  Female who presents with a chief complaint of Abdominal pain (13 May 2018 16:15)    Patient seen and examined at bedside, No acute overnight events.  Patient minimally ambulating, drinking clear liquids without difficulty, voiding and last BM 2 days ago.  Cardiac monitor reviewed- sinus viktor with HR around 40s    ROS: Denies fever, chest pain, SOB, abdominal pain, diarrhea, constipation, calf pain.    VS:   Vital Signs Last 24 Hrs  T(C): 36.5 (15 May 2018 02:35), Max: 37.2 (14 May 2018 15:00)  T(F): 97.7 (15 May 2018 02:35), Max: 98.9 (14 May 2018 15:00)  HR: 37 (15 May 2018 05:41) (37 - 50)  BP: 100/60 (15 May 2018 05:41) (100/60 - 146/72)  RR: 18 (15 May 2018 05:41) (18 - 18)  SpO2: 96% (15 May 2018 05:41) (95% - 96%)      Physical Exam:   Gen: NAD  HEENT: NCAT, EOMI, PERRLA,  CVS: RRR, +S1/S2, no murmurs, rubs or gallops appreciated  Lungs: CTAB, no wheeze, rales, rhonchi  Abdomen: +BS, soft, ND, NT. no palpable flank tenderness or mass, no CVA tenderness  Ext: No cyanosis, edema or calf tenderness  Neuro: AAOx3, no focal deficits.    Labs:                        9.4    4.5   )-----------( 199      ( 15 May 2018 03:54 )             29.0   05-15    139  |  103  |  12.0  ----------------------------<  104  3.8   |  28.0  |  0.73    Ca    8.6      15 May 2018 03:54  Phos  2.5     05-14  Mg     1.5     05-14    TPro  4.7<L>  /  Alb  2.3<L>  /  TBili  0.3<L>  /  DBili  x   /  AST  19  /  ALT  66<H>  /  AlkPhos  155<H>  05-15      05-14 @ 07:01  -  05-15 @ 07:00  --------------------------------------------------------  IN: 1050 mL / OUT: 0 mL / NET: 1050 mL      Medications:  MEDICATIONS  (STANDING):  ertapenem  IVPB      ertapenem  IVPB 1000 milliGRAM(s) IV Intermittent every 24 hours  indomethacin Suppository 100 milliGRAM(s) Rectal once  lactated ringers. 1000 milliLiter(s) (150 mL/Hr) IV Continuous <Continuous>  metoprolol tartrate 12.5 milliGRAM(s) Oral two times a day  pantoprazole  Injectable 40 milliGRAM(s) IV Push two times a day  saccharomyces boulardii 250 milliGRAM(s) Oral two times a day    MEDICATIONS  (PRN):  acetaminophen   Tablet 650 milliGRAM(s) Oral every 6 hours PRN For Temp greater than 38 C (100.4 F)  acetaminophen   Tablet. 650 milliGRAM(s) Oral every 6 hours PRN Mild Pain (1 - 3)  ondansetron Injectable 4 milliGRAM(s) IV Push every 8 hours PRN Nausea and/or Vomiting  oxyCODONE    IR 5 milliGRAM(s) Oral every 6 hours PRN Moderate Pain (4 - 6)

## 2018-05-15 NOTE — BRIEF OPERATIVE NOTE - PROCEDURE
<<-----Click on this checkbox to enter Procedure Laparoscopic cholecystectomy  05/15/2018    Active  YVETTE

## 2018-05-15 NOTE — BRIEF OPERATIVE NOTE - PRE-OP DX
Choledocholithiasis  05/14/2018    Active  Dagoberto Eason  Gallstone pancreatitis  05/14/2018    Active  Dagoberto Eason
Choledocholithiasis  05/14/2018    Active  Dagoberto Eason  Gallstone pancreatitis  05/14/2018    Active  Dagoberto Eason

## 2018-05-15 NOTE — PROGRESS NOTE ADULT - ASSESSMENT
79 y/o F with gallstone pancreatitis and choledocholithiasis, nonotoxic, HD nl without stigmata of cholecystitis/cholangitis. s/p ERCP, Bradycardic ON- Awaiting cardiac clearance for possible lap marsha today.    -NPO  -IVF  -Possible OR lap marsha once cardiac clearance is done  -strict Is and Os  -pain control  -encourage ambulation and IS  -DVT ppx  -POC

## 2018-05-15 NOTE — PROGRESS NOTE ADULT - SUBJECTIVE AND OBJECTIVE BOX
DENNIS BROWN  [id number]      Chief Complaint:  Pre-op Eval/CAD s/p CABG    Interval History:  Events reviewed.  Patient transferred to T due to viktor o/n.  Patient without c/o.  Denies CP/SOB/palps/dizziness/syncope.    Tele:  SR with viktor o/n to 30s, goes to 90s with ambulation        acetaminophen   Tablet 650 milliGRAM(s) Oral every 6 hours PRN  acetaminophen   Tablet. 650 milliGRAM(s) Oral every 6 hours PRN  ertapenem  IVPB      ertapenem  IVPB 1000 milliGRAM(s) IV Intermittent every 24 hours  indomethacin Suppository 100 milliGRAM(s) Rectal once  lactated ringers. 1000 milliLiter(s) IV Continuous <Continuous>  ondansetron Injectable 4 milliGRAM(s) IV Push every 8 hours PRN  oxyCODONE    IR 5 milliGRAM(s) Oral every 6 hours PRN  pantoprazole  Injectable 40 milliGRAM(s) IV Push two times a day  saccharomyces boulardii 250 milliGRAM(s) Oral two times a day          Physical Exam:  T(C): 36.5 (05-15-18 @ 02:35), Max: 37.2 (05-14-18 @ 15:00)  HR: 37 (05-15-18 @ 05:41) (37 - 50)  BP: 100/60 (05-15-18 @ 05:41) (100/60 - 146/72)  RR: 18 (05-15-18 @ 05:41) (18 - 18)  SpO2: 96% (05-15-18 @ 05:41) (95% - 96%)  Wt(kg): --  General: Comfortable in NAD  Neck: No JVD  CVS: nl s1s2, no s3  Pulm: CTA b/l  Abd: soft, mild TTP  Ext: No c/c/e  Neuro A&O x3  Psych: Normal affect        Labs:   15 May 2018 03:54    139    |  103    |  12.0   ----------------------------<  104    3.8     |  28.0   |  0.73     Ca    8.6        15 May 2018 03:54  Phos  2.5       14 May 2018 22:15  Mg     1.5       14 May 2018 22:15    TPro  4.7    /  Alb  2.3    /  TBili  0.3    /  DBili  x      /  AST  19     /  ALT  66     /  AlkPhos  155    15 May 2018 03:54                          9.4    4.5   )-----------( 199      ( 15 May 2018 03:54 )             29.0     PT/INR - ( 15 May 2018 03:54 )   PT: 11.6 sec;   INR: 1.05 ratio         PTT - ( 15 May 2018 03:54 )  PTT:30.3 sec  CARDIAC MARKERS ( 14 May 2018 22:32 )  x     / <0.01 ng/mL / x     / x     / x          Assessment:  78yFemale PMHX CAD s/p CABG, HTN, HLD p/w abd pain and recurrent gallstone pancreatitis.  Patient feels better now s/p ERCP, for lap marsha.  Patient has been stable from CV perspective.  No evidence of CHF or ischemia.  Does >4 METs PTA without limitation.  No CV contraindication at mod CV risk.  EKG with T-wave changes, chronic and unchanged.  -Patient with viktor o/n while sleeping primarily.  Likely has CHARLEE as a cause.  Asymptomatic and normal response to exertion.    Plan:  1. Proceed for marsha  2. Periop monitor  3. Postop EKG  4. Can hold BB with viktor  5. Hold statin with transaminitis  6. Hold ASA and restart per surgery    D/w surgery team

## 2018-05-15 NOTE — BRIEF OPERATIVE NOTE - POST-OP DX
Choledocholithiasis  05/14/2018    Active  Dagoberto Eason
Choledocholithiasis  05/14/2018    Active  Dagoberto Eason

## 2018-05-15 NOTE — PROGRESS NOTE ADULT - SUBJECTIVE AND OBJECTIVE BOX
INTERVAL HPI/OVERNIGHT EVENTS:    Patient seen and examined this AM  Bradycardic ON in the 30s   NPO for possible procedure, adequate UOP, ambulating  denies f/c/n/v, SOB or CP    MEDICATIONS  (STANDING):  aspirin  chewable 81 milliGRAM(s) Oral daily  enoxaparin Injectable 40 milliGRAM(s) SubCutaneous daily  lactated ringers. 1000 milliLiter(s) (64 mL/Hr) IV Continuous <Continuous>  pantoprazole    Tablet 40 milliGRAM(s) Oral before breakfast  saccharomyces boulardii 250 milliGRAM(s) Oral two times a day    MEDICATIONS  (PRN):  acetaminophen   Tablet. 650 milliGRAM(s) Oral every 6 hours PRN Mild Pain (1 - 3)  oxyCODONE    IR 5 milliGRAM(s) Oral every 6 hours PRN Moderate Pain (4 - 6)      Vital Signs Last 24 Hrs  T(C): 36.3 (15 May 2018 16:58), Max: 37.2 (15 May 2018 14:50)  T(F): 97.4 (15 May 2018 16:58), Max: 99 (15 May 2018 14:50)  HR: 58 (15 May 2018 16:58) (37 - 71)  BP: 130/66 (15 May 2018 16:58) (100/60 - 161/76)  BP(mean): --  RR: 18 (15 May 2018 16:58) (13 - 19)  SpO2: 95% (15 May 2018 16:58) (94% - 98%)    Physical Exam:    Neurological:  No sensory/motor deficits    HEENT: PERRLA, EOMI, no drainage or redness    Neck: No bruits; no thyromegaly or nodules,  No JVD    Back: Normal spine flexure, No CVA tenderness, No deformity or limitation of movement    Respiratory: Breath Sounds equal & clear to auscultation, no accessory muscle use    Cardiovascular: Regular rate & rhythm, normal S1, S2; no murmurs, gallops or rubs    Gastrointestinal: Soft, mildly tender in the RUQ, normal bowel sounds, no rebound or guarding     Extremities: No peripheral edema, No cyanosis, clubbing     Vascular: Equal and normal pulses: 2+ peripheral pulses throughout    Musculoskeletal: No joint pain, swelling or deformity; no limitation of movement    Skin: No rashes      I&O's Detail    14 May 2018 07:01  -  15 May 2018 07:00  --------------------------------------------------------  IN:    lactated ringers.: 1050 mL  Total IN: 1050 mL    OUT:  Total OUT: 0 mL    Total NET: 1050 mL      15 May 2018 07:01  -  15 May 2018 17:46  --------------------------------------------------------  IN:    lactated ringers.: 450 mL    lactated ringers.: 128 mL  Total IN: 578 mL    OUT:  Total OUT: 0 mL    Total NET: 578 mL          LABS:                        9.4    4.5   )-----------( 199      ( 15 May 2018 03:54 )             29.0     05-15    139  |  103  |  12.0  ----------------------------<  104  3.8   |  28.0  |  0.73    Ca    8.6      15 May 2018 03:54  Phos  2.5     05-14  Mg     1.5     05-14    TPro  4.7<L>  /  Alb  2.3<L>  /  TBili  0.3<L>  /  DBili  x   /  AST  19  /  ALT  66<H>  /  AlkPhos  155<H>  05-15    PT/INR - ( 15 May 2018 03:54 )   PT: 11.6 sec;   INR: 1.05 ratio         PTT - ( 15 May 2018 03:54 )  PTT:30.3 sec      RADIOLOGY & ADDITIONAL STUDIES:

## 2018-05-15 NOTE — BRIEF OPERATIVE NOTE - OPERATION/FINDINGS
A large roughly 12 mm. stone was removed after a 12 mm. sphincterotomy was made. Stone seen delivered from CBD. CBD swept thereafter with no residual calculi. CBD cleared. Clear liquid diet today. If stable may proceed with cholecystectomy tomorrow. IVF. Indocin suppositories given post procedure.
gall bladder with stones.

## 2018-05-15 NOTE — PROGRESS NOTE ADULT - SUBJECTIVE AND OBJECTIVE BOX
Pt seen and examined. She is s/p successful ERCP with ES and BSE yesterday and was transferred to CTU last night for asymptomatic bradycardia. She has no c/o abdominal pain post ERCP and states that her abdominal pain resolved after ERCP yesterday.     REVIEW OF SYSTEMS:  Constitutional: No fever, weight loss or fatigue  Cardiovascular: No chest pain, palpitations, dizziness or leg swelling  Gastrointestinal: No abdominal or epigastric pain. No nausea, vomiting or hematemesis; No diarrhea or constipation. No melena or hematochezia.  Skin: No itching, burning, rashes or lesions       MEDICATIONS:  MEDICATIONS  (STANDING):  ertapenem  IVPB      ertapenem  IVPB 1000 milliGRAM(s) IV Intermittent every 24 hours  indomethacin Suppository 100 milliGRAM(s) Rectal once  lactated ringers. 1000 milliLiter(s) (150 mL/Hr) IV Continuous <Continuous>  metoprolol tartrate 12.5 milliGRAM(s) Oral two times a day  pantoprazole  Injectable 40 milliGRAM(s) IV Push two times a day  saccharomyces boulardii 250 milliGRAM(s) Oral two times a day    MEDICATIONS  (PRN):  acetaminophen   Tablet 650 milliGRAM(s) Oral every 6 hours PRN For Temp greater than 38 C (100.4 F)  acetaminophen   Tablet. 650 milliGRAM(s) Oral every 6 hours PRN Mild Pain (1 - 3)  ondansetron Injectable 4 milliGRAM(s) IV Push every 8 hours PRN Nausea and/or Vomiting  oxyCODONE    IR 5 milliGRAM(s) Oral every 6 hours PRN Moderate Pain (4 - 6)      Allergies    No Known Allergies    Intolerances        Vital Signs Last 24 Hrs  T(C): 36.5 (15 May 2018 02:35), Max: 37.2 (14 May 2018 15:00)  T(F): 97.7 (15 May 2018 02:35), Max: 98.9 (14 May 2018 15:00)  HR: 37 (15 May 2018 05:41) (37 - 50)  BP: 100/60 (15 May 2018 05:41) (100/60 - 146/72)  BP(mean): --  RR: 18 (15 May 2018 05:41) (18 - 18)  SpO2: 96% (15 May 2018 05:41) (95% - 96%)    05-14 @ 07:01  -  05-15 @ 07:00  --------------------------------------------------------  IN: 1050 mL / OUT: 0 mL / NET: 1050 mL        PHYSICAL EXAM:    General: Well developed; well nourished; in no acute distress  HEENT: MMM, conjunctiva pink and sclera anicteric.  Lungs: clear to auscultation bilaterally.  Cor: HR in the 40's Sinus bradycardia on monitor.  Gastrointestinal: Abdomen: Soft non-tender non-distended; Normal bowel sounds; No hepatosplenomegaly  Extremities: no cyanosis, clubbing or edema.  Skin: Warm and dry. No obvious rash  Neuro: Pt. a + o x 3.    LABS:      CBC Full  -  ( 15 May 2018 03:54 )  WBC Count : 4.5 K/uL  Hemoglobin : 9.4 g/dL  Hematocrit : 29.0 %  Platelet Count - Automated : 199 K/uL  Mean Cell Volume : 90.9 fl  Mean Cell Hemoglobin : 29.5 pg  Mean Cell Hemoglobin Concentration : 32.4 g/dL  Auto Neutrophil # : 3.1 K/uL  Auto Lymphocyte # : 0.9 K/uL  Auto Monocyte # : 0.3 K/uL  Auto Eosinophil # : 0.2 K/uL  Auto Basophil # : 0.0 K/uL  Auto Neutrophil % : 68.8 %  Auto Lymphocyte % : 19.6 %  Auto Monocyte % : 6.6 %  Auto Eosinophil % : 4.6 %  Auto Basophil % : 0.2 %    05-15    139  |  103  |  12.0  ----------------------------<  104  3.8   |  28.0  |  0.73    Ca    8.6      15 May 2018 03:54  Phos  2.5     05-14  Mg     1.5     05-14    TPro  4.7<L>  /  Alb  2.3<L>  /  TBili  0.3<L>  /  DBili  x   /  AST  19  /  ALT  66<H>  /  AlkPhos  155<H>  05-15    PT/INR - ( 15 May 2018 03:54 )   PT: 11.6 sec;   INR: 1.05 ratio         PTT - ( 15 May 2018 03:54 )  PTT:30.3 sec                  RADIOLOGY & ADDITIONAL STUDIES (The following images were personally reviewed):

## 2018-05-16 LAB
ALBUMIN SERPL ELPH-MCNC: 2.8 G/DL — LOW (ref 3.3–5.2)
ALP SERPL-CCNC: 173 U/L — HIGH (ref 40–120)
ALT FLD-CCNC: 67 U/L — HIGH
ANION GAP SERPL CALC-SCNC: 11 MMOL/L — SIGNIFICANT CHANGE UP (ref 5–17)
AST SERPL-CCNC: 27 U/L — SIGNIFICANT CHANGE UP
BASOPHILS # BLD AUTO: 0 K/UL — SIGNIFICANT CHANGE UP (ref 0–0.2)
BASOPHILS NFR BLD AUTO: 0.1 % — SIGNIFICANT CHANGE UP (ref 0–2)
BILIRUB SERPL-MCNC: 0.2 MG/DL — LOW (ref 0.4–2)
BUN SERPL-MCNC: 10 MG/DL — SIGNIFICANT CHANGE UP (ref 8–20)
CALCIUM SERPL-MCNC: 8.9 MG/DL — SIGNIFICANT CHANGE UP (ref 8.6–10.2)
CHLORIDE SERPL-SCNC: 103 MMOL/L — SIGNIFICANT CHANGE UP (ref 98–107)
CO2 SERPL-SCNC: 28 MMOL/L — SIGNIFICANT CHANGE UP (ref 22–29)
CREAT SERPL-MCNC: 0.75 MG/DL — SIGNIFICANT CHANGE UP (ref 0.5–1.3)
EOSINOPHIL # BLD AUTO: 0 K/UL — SIGNIFICANT CHANGE UP (ref 0–0.5)
EOSINOPHIL NFR BLD AUTO: 0 % — SIGNIFICANT CHANGE UP (ref 0–6)
GLUCOSE SERPL-MCNC: 112 MG/DL — SIGNIFICANT CHANGE UP (ref 70–115)
HCT VFR BLD CALC: 35.2 % — LOW (ref 37–47)
HGB BLD-MCNC: 11.4 G/DL — LOW (ref 12–16)
LYMPHOCYTES # BLD AUTO: 0.5 K/UL — LOW (ref 1–4.8)
LYMPHOCYTES # BLD AUTO: 7.4 % — LOW (ref 20–55)
MAGNESIUM SERPL-MCNC: 1.7 MG/DL — SIGNIFICANT CHANGE UP (ref 1.6–2.6)
MCHC RBC-ENTMCNC: 29.7 PG — SIGNIFICANT CHANGE UP (ref 27–31)
MCHC RBC-ENTMCNC: 32.4 G/DL — SIGNIFICANT CHANGE UP (ref 32–36)
MCV RBC AUTO: 91.7 FL — SIGNIFICANT CHANGE UP (ref 81–99)
MONOCYTES # BLD AUTO: 0.2 K/UL — SIGNIFICANT CHANGE UP (ref 0–0.8)
MONOCYTES NFR BLD AUTO: 2.5 % — LOW (ref 3–10)
NEUTROPHILS # BLD AUTO: 6.1 K/UL — SIGNIFICANT CHANGE UP (ref 1.8–8)
NEUTROPHILS NFR BLD AUTO: 90 % — HIGH (ref 37–73)
PHOSPHATE SERPL-MCNC: 3.6 MG/DL — SIGNIFICANT CHANGE UP (ref 2.4–4.7)
PLATELET # BLD AUTO: 268 K/UL — SIGNIFICANT CHANGE UP (ref 150–400)
POTASSIUM SERPL-MCNC: 4.3 MMOL/L — SIGNIFICANT CHANGE UP (ref 3.5–5.3)
POTASSIUM SERPL-SCNC: 4.3 MMOL/L — SIGNIFICANT CHANGE UP (ref 3.5–5.3)
PROT SERPL-MCNC: 5.7 G/DL — LOW (ref 6.6–8.7)
RBC # BLD: 3.84 M/UL — LOW (ref 4.4–5.2)
RBC # FLD: 12.9 % — SIGNIFICANT CHANGE UP (ref 11–15.6)
SODIUM SERPL-SCNC: 142 MMOL/L — SIGNIFICANT CHANGE UP (ref 135–145)
WBC # BLD: 6.8 K/UL — SIGNIFICANT CHANGE UP (ref 4.8–10.8)
WBC # FLD AUTO: 6.8 K/UL — SIGNIFICANT CHANGE UP (ref 4.8–10.8)

## 2018-05-16 PROCEDURE — 99232 SBSQ HOSP IP/OBS MODERATE 35: CPT | Mod: GC

## 2018-05-16 RX ADMIN — OXYCODONE HYDROCHLORIDE 5 MILLIGRAM(S): 5 TABLET ORAL at 15:44

## 2018-05-16 RX ADMIN — OXYCODONE HYDROCHLORIDE 5 MILLIGRAM(S): 5 TABLET ORAL at 21:50

## 2018-05-16 RX ADMIN — Medication 81 MILLIGRAM(S): at 11:44

## 2018-05-16 RX ADMIN — Medication 250 MILLIGRAM(S): at 05:48

## 2018-05-16 RX ADMIN — OXYCODONE HYDROCHLORIDE 5 MILLIGRAM(S): 5 TABLET ORAL at 15:14

## 2018-05-16 RX ADMIN — ENOXAPARIN SODIUM 40 MILLIGRAM(S): 100 INJECTION SUBCUTANEOUS at 21:20

## 2018-05-16 RX ADMIN — PANTOPRAZOLE SODIUM 40 MILLIGRAM(S): 20 TABLET, DELAYED RELEASE ORAL at 05:48

## 2018-05-16 RX ADMIN — OXYCODONE HYDROCHLORIDE 5 MILLIGRAM(S): 5 TABLET ORAL at 05:48

## 2018-05-16 RX ADMIN — OXYCODONE HYDROCHLORIDE 5 MILLIGRAM(S): 5 TABLET ORAL at 21:20

## 2018-05-16 NOTE — PROGRESS NOTE ADULT - SUBJECTIVE AND OBJECTIVE BOX
CC: Patient is a 78y old  Female who presents with a chief complaint of Abdominal pain (13 May 2018 16:15)    Patient seen and examined at bedside, No acute overnight events. Patient s/p lap marsha 5/15 w/o complications. Patient this AM with abdominal pain well controlled on PO medications.   Patient encouraged to get OOB, drinking fluids,, voiding and last BM 3 days ago.  Sinus Melchor on Cardiac monitor      ROS: Denies fever, chest pain, SOB, diarrhea, constipation, calf pain.    VS:   Vital Signs Last 24 Hrs  T(C): 36.6 (16 May 2018 05:44), Max: 37.2 (15 May 2018 14:50)  T(F): 97.8 (16 May 2018 05:44), Max: 99 (15 May 2018 14:50)  HR: 54 (16 May 2018 05:44) (45 - 71)  BP: 152/76 (16 May 2018 05:44) (112/61 - 161/76)  RR: 17 (16 May 2018 05:44) (13 - 19)  SpO2: 95% (16 May 2018 05:44) (94% - 98%)    Physical Exam:   Gen: NAD  HEENT: NCAT, EOMI, PERRLA, Pupils_  CVS: RRR, +S1/S2, no murmurs, rubs or gallops appreciated  Lungs: CTAB, no wheeze, rales, rhonchi  Abdomen: +BS, soft, ND, NT. no palpable flank tenderness or mass, no CVA tenderness  Ext: No cyanosis, edema or calf tenderness  Neuro: AAOx3, no focal deficits.    Labs:                        11.4   6.8   )-----------( 268      ( 16 May 2018 06:34 )             35.2   05-16    142  |  103  |  10.0  ----------------------------<  112  4.3   |  28.0  |  0.75    Ca    8.9      16 May 2018 06:34  Phos  2.5     05-14  Mg     1.7     05-16    TPro  5.7<L>  /  Alb  x   /  TBili  0.2<L>  /  DBili  x   /  AST  27  /  ALT  67<H>  /  AlkPhos  173<H>  05-16      05-15 @ 07:01 - 05-16 @ 07:00  --------------------------------------------------------  IN: 578 mL / OUT: 0 mL / NET: 578 mL    Medications:  MEDICATIONS  (STANDING):  aspirin  chewable 81 milliGRAM(s) Oral daily  enoxaparin Injectable 40 milliGRAM(s) SubCutaneous daily  lactated ringers. 1000 milliLiter(s) (64 mL/Hr) IV Continuous <Continuous>  pantoprazole    Tablet 40 milliGRAM(s) Oral before breakfast  saccharomyces boulardii 250 milliGRAM(s) Oral two times a day    MEDICATIONS  (PRN):  acetaminophen   Tablet. 650 milliGRAM(s) Oral every 6 hours PRN Mild Pain (1 - 3)  oxyCODONE    IR 5 milliGRAM(s) Oral every 6 hours PRN Moderate Pain (4 - 6)

## 2018-05-16 NOTE — PROGRESS NOTE ADULT - ASSESSMENT
78y Female with PMH of HTN, HLD, Gastritis, ?paroxysmal Afib, s/p bypass surgery in 2016, presenting with upper abdominal pain that acutely on Thursday night after eating, found to be febrile here in the ED with lipase of 1300 and CT scan showing mild pancreatitis, mass in the pancreatic tail and mass in the kidney suggestive of possible renal cell carcinoma. Patient now on IV Invanz with downtrending lipase, MRI showing Distal common bile duct stone results in mild intrahepatic and extrahepatic biliary ductal dilatation, consistent with  interstitial edematous pancreatitis. Patient also underwent laparoscopic cholecystectomy on 5/15/18 w/o complications

## 2018-05-17 VITALS
TEMPERATURE: 98 F | DIASTOLIC BLOOD PRESSURE: 62 MMHG | SYSTOLIC BLOOD PRESSURE: 128 MMHG | OXYGEN SATURATION: 94 % | HEART RATE: 60 BPM | RESPIRATION RATE: 16 BRPM

## 2018-05-17 PROCEDURE — 99285 EMERGENCY DEPT VISIT HI MDM: CPT | Mod: 25

## 2018-05-17 PROCEDURE — 99232 SBSQ HOSP IP/OBS MODERATE 35: CPT

## 2018-05-17 PROCEDURE — 86900 BLOOD TYPING SEROLOGIC ABO: CPT

## 2018-05-17 PROCEDURE — 83690 ASSAY OF LIPASE: CPT

## 2018-05-17 PROCEDURE — 85730 THROMBOPLASTIN TIME PARTIAL: CPT

## 2018-05-17 PROCEDURE — 86901 BLOOD TYPING SEROLOGIC RH(D): CPT

## 2018-05-17 PROCEDURE — 80076 HEPATIC FUNCTION PANEL: CPT

## 2018-05-17 PROCEDURE — 71045 X-RAY EXAM CHEST 1 VIEW: CPT

## 2018-05-17 PROCEDURE — 85027 COMPLETE CBC AUTOMATED: CPT

## 2018-05-17 PROCEDURE — 86850 RBC ANTIBODY SCREEN: CPT

## 2018-05-17 PROCEDURE — 80048 BASIC METABOLIC PNL TOTAL CA: CPT

## 2018-05-17 PROCEDURE — 99239 HOSP IP/OBS DSCHRG MGMT >30: CPT

## 2018-05-17 PROCEDURE — 80053 COMPREHEN METABOLIC PANEL: CPT

## 2018-05-17 PROCEDURE — 84484 ASSAY OF TROPONIN QUANT: CPT

## 2018-05-17 PROCEDURE — 81001 URINALYSIS AUTO W/SCOPE: CPT

## 2018-05-17 PROCEDURE — 93005 ELECTROCARDIOGRAM TRACING: CPT

## 2018-05-17 PROCEDURE — 74177 CT ABD & PELVIS W/CONTRAST: CPT

## 2018-05-17 PROCEDURE — 88304 TISSUE EXAM BY PATHOLOGIST: CPT

## 2018-05-17 PROCEDURE — C1889: CPT

## 2018-05-17 PROCEDURE — 74182 MRI ABDOMEN W/CONTRAST: CPT

## 2018-05-17 PROCEDURE — 84443 ASSAY THYROID STIM HORMONE: CPT

## 2018-05-17 PROCEDURE — C1769: CPT

## 2018-05-17 PROCEDURE — 96372 THER/PROPH/DIAG INJ SC/IM: CPT | Mod: XU

## 2018-05-17 PROCEDURE — 36415 COLL VENOUS BLD VENIPUNCTURE: CPT

## 2018-05-17 PROCEDURE — 76705 ECHO EXAM OF ABDOMEN: CPT

## 2018-05-17 PROCEDURE — 84100 ASSAY OF PHOSPHORUS: CPT

## 2018-05-17 PROCEDURE — 96375 TX/PRO/DX INJ NEW DRUG ADDON: CPT

## 2018-05-17 PROCEDURE — 82962 GLUCOSE BLOOD TEST: CPT

## 2018-05-17 PROCEDURE — 83735 ASSAY OF MAGNESIUM: CPT

## 2018-05-17 PROCEDURE — 74330 X-RAY BILE/PANC ENDOSCOPY: CPT

## 2018-05-17 PROCEDURE — 96374 THER/PROPH/DIAG INJ IV PUSH: CPT | Mod: XU

## 2018-05-17 PROCEDURE — 85610 PROTHROMBIN TIME: CPT

## 2018-05-17 PROCEDURE — 84145 PROCALCITONIN (PCT): CPT

## 2018-05-17 RX ORDER — OXYCODONE HYDROCHLORIDE 5 MG/1
1 TABLET ORAL
Qty: 8 | Refills: 0 | OUTPATIENT
Start: 2018-05-17 | End: 2018-05-18

## 2018-05-17 RX ORDER — PANTOPRAZOLE SODIUM 20 MG/1
1 TABLET, DELAYED RELEASE ORAL
Qty: 0 | Refills: 0 | COMMUNITY
Start: 2018-05-17

## 2018-05-17 RX ORDER — ACETAMINOPHEN 500 MG
2 TABLET ORAL
Qty: 40 | Refills: 0 | OUTPATIENT
Start: 2018-05-17 | End: 2018-05-21

## 2018-05-17 RX ADMIN — PANTOPRAZOLE SODIUM 40 MILLIGRAM(S): 20 TABLET, DELAYED RELEASE ORAL at 05:34

## 2018-05-17 RX ADMIN — Medication 81 MILLIGRAM(S): at 12:35

## 2018-05-17 NOTE — CHART NOTE - NSCHARTNOTEFT_GEN_A_CORE
Upon Nutritional Assessment by the Registered Dietitian your patient was determined to meet criteria / has evidence of the following diagnosis/diagnoses:          [x ]  Mild Protein Calorie Malnutrition        [ ]  Moderate Protein Calorie Malnutrition        [ ] Severe Protein Calorie Malnutrition        [ ] Unspecified Protein Calorie Malnutrition        [ ] Underweight / BMI <19        [ ] Morbid Obesity / BMI > 40      Findings as based on:  •  Comprehensive nutrition assessment and consultation  •  Calorie counts (nutrient intake analysis)  •  Food acceptance and intake status from observations by staff  •  Follow up  •  Patient education  •  Intervention secondary to interdisciplinary rounds  •   concerns      Treatment:    The following diet has been recommended:  Pt declined nutrition supplements.  Goal is for pt to maintain po intake >75% at meals.    PROVIDER Section:     By signing this assessment you are acknowledging and agree with the diagnosis/diagnoses assigned by the Registered Dietitian    Comments:

## 2018-05-17 NOTE — PROGRESS NOTE ADULT - ASSESSMENT
78y Female with PMH of HTN, HLD, Gastritis, ?paroxysmal Afib, s/p bypass surgery in 2016, presenting with upper abdominal pain that acutely on Thursday night after eating, found to be febrile here in the ED with lipase of 1300 and CT scan showing mild pancreatitis, mass in the pancreatic tail and mass in the kidney suggestive of possible renal cell carcinoma. Patient initially was on IV Invanz , no is off antibiotic , MRI showing Distal common bile duct stone results in mild intrahepatic and extrahepatic biliary ductal dilatation, consistent with  interstitial edematous pancreatitis. Patient also underwent laparoscopic cholecystectomy on 5/15/18 w/o complications .Pt has been afebrile and hemodynamically stable.

## 2018-05-17 NOTE — PROGRESS NOTE ADULT - PROBLEM SELECTOR PLAN 5
May be contributing to patient abdominal pain  Protonix 40mg IVP BID

## 2018-05-17 NOTE — PROGRESS NOTE ADULT - PROBLEM SELECTOR PLAN 6
BP stable - will monitor  Patient on metoprolol - held due to asymptomatic bradycardia
BP stable around .  Patient on metoprolol 25 BID
BP stable around .  Patient on metoprolol 25 BID
BP stable - will monitor  Patient on metoprolol - held due to asymptomatic bradycardia
BP stable around .  Hold home BP meds at this time

## 2018-05-17 NOTE — PROGRESS NOTE ADULT - PROBLEM SELECTOR PLAN 7
Hold home statin at this time given elevated LFTs

## 2018-05-17 NOTE — PROGRESS NOTE ADULT - SUBJECTIVE AND OBJECTIVE BOX
DENNIS BROWN  [id number]        Chief Complaint: follow up CAD/CABG/gallstone pancreatitis    Subjective: no cp/sob/palps, mild cough        acetaminophen   Tablet. 650 milliGRAM(s) Oral every 6 hours PRN  Areds 2 Eye Vitamin 2 Capsule(s) 2 Capsule(s) Oral daily  aspirin  chewable 81 milliGRAM(s) Oral daily  enoxaparin Injectable 40 milliGRAM(s) SubCutaneous daily  oxyCODONE    IR 5 milliGRAM(s) Oral every 6 hours PRN  pantoprazole    Tablet 40 milliGRAM(s) Oral before breakfast          Physical Exam:  T(C): 36.7 (05-17-18 @ 10:38), Max: 36.7 (05-17-18 @ 10:38)  HR: 58 (05-17-18 @ 10:38) (55 - 60)  BP: 118/60 (05-17-18 @ 10:38) (112/70 - 158/88)  RR: 16 (05-17-18 @ 10:38) (16 - 18)  SpO2: 97% (05-17-18 @ 10:38) (92% - 97%)  Wt(kg): --  General: Comfortable in NAD  HEENT: MMM, sclera anicteric  Resp:  course bilaterally, no rales/rhonchi  CVS: nl s1s2, rrr, no s3/JVD  Abd: soft, NT, ND, BS+  Ext: No c/c/e  Neuro A&O x3  Psych: Normal affect    I&O's Summary    16 May 2018 07:01  -  17 May 2018 07:00  --------------------------------------------------------  IN: 560 mL / OUT: 300 mL / NET: 260 mL          Labs:   16 May 2018 06:34    142    |  103    |  10.0   ----------------------------<  112    4.3     |  28.0   |  0.75     Ca    8.9        16 May 2018 06:34  Phos  3.6       16 May 2018 06:34  Mg     1.7       16 May 2018 06:34    TPro  5.7    /  Alb  2.8    /  TBili  0.2    /  DBili  x      /  AST  27     /  ALT  67     /  AlkPhos  173    16 May 2018 06:34                          11.4   6.8   )-----------( 268      ( 16 May 2018 06:34 )             35.2       Assessment:  78yFemale PMHX CAD s/p CABG, HTN, HLD p/w abd pain and recurrent gallstone pancreatitis.  Patient feels better now s/p ERCP, for lap marsha.  Patient has been stable from CV perspective.  No evidence of CHF or ischemia.  Does >4 METs PTA without limitation.  No CV contraindication at mod CV risk.  EKG with T-wave changes, chronic and unchanged.  -Patient with viktor o/n while sleeping primarily.  Likely has CHARLEE as a cause.  Asymptomatic and normal response to exertion.  -Tolerated surgery well without CV complication      Plan:  1. Continue CV meds and med management of CAD  2. CV stable for discharge  3. Outpatient follow up and sleep study  4. Will not actively follow, call if questions, thanks!

## 2018-05-17 NOTE — PROGRESS NOTE ADULT - PROBLEM SELECTOR PROBLEM 2
Gallstone pancreatitis
Pancreatic mass

## 2018-05-17 NOTE — PROGRESS NOTE ADULT - PROBLEM SELECTOR PLAN 3
2,5 cm mass in left kidney concerning left renal cell carcinoma  Urology consult appreciated   No urgent intervention inpatient, patient to follow up outpatient for workup.
2,5 cm mass in left kidney concerning left renal cell carcinoma  Urology consult appreciated.   No urgent intervention inpatient, patient to follow up outpatient for workup.

## 2018-05-17 NOTE — PROGRESS NOTE ADULT - ASSESSMENT
77 y/o F s/p Lap marsha for choledocholithiasis POD2, nontoxic Hd nl  -Clear for discharge from surgery standpoint, rest of care per primary team, Follow up in [facility] clinic in 1-2 weeks

## 2018-05-17 NOTE — PROGRESS NOTE ADULT - PROBLEM SELECTOR PLAN 2
1.4 cm mass in the pancreatic tail  GI consult appreciated
Resolved clinically. See above management plan. Signing off. Re-consult as needed. Thank you.
1.4 cm mass in the pancreatic tail  GI consult appreciated
1.4 cm mass in the pancreatic tail  will get MRI abdomen to evaluate  GI consult appreciated

## 2018-05-17 NOTE — PROGRESS NOTE ADULT - PROVIDER SPECIALTY LIST ADULT
Cardiology
Family Medicine
Family Medicine
Gastroenterology
Surgery
Surgery
Urology
Gastroenterology
Gastroenterology
Family Medicine

## 2018-05-17 NOTE — PROGRESS NOTE ADULT - SUBJECTIVE AND OBJECTIVE BOX
Pt seen and examined bedside, no acute events overnight. Pt tolerating diet, denies f/c/n/v. Has not had recent BM    Vital Signs Last 24 Hrs  T(C): 36.7 (17 May 2018 15:00), Max: 36.7 (17 May 2018 10:38)  T(F): 98 (17 May 2018 15:00), Max: 98 (17 May 2018 10:38)  HR: 60 (17 May 2018 15:00) (55 - 60)  BP: 128/62 (17 May 2018 15:00) (118/60 - 158/88)  BP(mean): --  RR: 16 (17 May 2018 15:00) (16 - 18)  SpO2: 94% (17 May 2018 15:00) (92% - 97%)    NAD, AAOX3  Head NCAT, EOMI  Chest expansion symmetric  Lungs CTAB  RRR, S1S2nl  Abd soft, ND, NTTP, no rebound/guarding/rigidity, incision c/d/i  Ext; No swelling/edema

## 2018-05-17 NOTE — PROGRESS NOTE ADULT - SUBJECTIVE AND OBJECTIVE BOX
CC: Patient is a 78y old  Female who presents with a chief complaint of Abdominal pain (13 May 2018 16:15)    Patient seen and examined at bedside, No acute overnight events. Patient s/p lap marsha 5/15 w/o complications. Patient encouraged to get OOB, drinking fluids,, voiding and last BM 3 days ago.        ROS: Denies fever, chest pain, SOB, diarrhea, constipation, calf pain.    Vital Signs Last 24 Hrs  T(C): 36.6 (17 May 2018 05:39), Max: 36.6 (16 May 2018 21:10)  T(F): 97.8 (17 May 2018 05:39), Max: 97.8 (16 May 2018 21:10)  HR: 56 (17 May 2018 05:39) (49 - 60)  BP: 126/68 (17 May 2018 05:39) (112/70 - 158/88)  RR: 18 (17 May 2018 05:39) (18 - 18)  SpO2: 97% (17 May 2018 05:39) (92% - 97%    )Physical Exam:   Gen: NAD  HEENT: NCAT, EOMI, PERRLA, Pupils_  CVS: RRR, +S1/S2, no murmurs, rubs or gallops appreciated  Lungs: CTAB, no wheeze, rales, rhonchi  Abdomen: +BS, soft, ND, NT. no palpable flank tenderness or mass, no CVA tenderness  Ext: No cyanosis, edema or calf tenderness  Neuro: AAOx3, no focal deficits.    Labs:                        11.4   6.8   )-----------( 268      ( 16 May 2018 06:34 )             35.2   05-16    142  |  103  |  10.0  ----------------------------<  112  4.3   |  28.0  |  0.75    Ca    8.9      16 May 2018 06:34  Phos  2.5     05-14  Mg     1.7     05-16    TPro  5.7<L>  /  Alb  x   /  TBili  0.2<L>  /  DBili  x   /  AST  27  /  ALT  67<H>  /  AlkPhos  173<H>  05-16      05-15 @ 07:01  -  05-16 @ 07:00  --------------------------------------------------------  IN: 578 mL / OUT: 0 mL / NET: 578 mL    Medications:  MEDICATIONS  (STANDING):  aspirin  chewable 81 milliGRAM(s) Oral daily  enoxaparin Injectable 40 milliGRAM(s) SubCutaneous daily  lactated ringers. 1000 milliLiter(s) (64 mL/Hr) IV Continuous <Continuous>  pantoprazole    Tablet 40 milliGRAM(s) Oral before breakfast  saccharomyces boulardii 250 milliGRAM(s) Oral two times a day    MEDICATIONS  (PRN):  acetaminophen   Tablet. 650 milliGRAM(s) Oral every 6 hours PRN Mild Pain (1 - 3)  oxyCODONE    IR 5 milliGRAM(s) Oral every 6 hours PRN Moderate Pain (4 - 6)

## 2018-05-17 NOTE — DIETITIAN INITIAL EVALUATION ADULT. - OTHER INFO
Pt reports improved po intake over the past 2 days- consuming ~75% at meals.  Pt declined nutrition supplements as appetite is improving.

## 2018-05-17 NOTE — PROGRESS NOTE ADULT - PROBLEM SELECTOR PLAN 1
Abdominal pain radiating to back, lipase of 1300, CT scan showing mild pancreatitis  secondary to gall stones and/or possible cholangitis given elevated LFTs  RUQ/abdominal US- dilated CBD with mild intrahepatic biliary dilation  Patient given IVF bolus in the ED  - MRI w CBD stones and  interstitial edematous pancreatitis.   -  s/p ERCP 5/14  with removal of 12mm CBD stone  - now s/p lap marsha 5/15 w/o complications
Resolved. No post ERCP c/o abdominal pain and in fact states that her pre-procedure abdominal pain resolved after yesterday's ERCP. She developed asymptomatic bradycardia last night which is not related to yesterday's ERCP. Management of this as per Cardiology. Eventual cholecystectomy once bradycardia resolved +/or pt. re-cleared by Cardiology for cholecystectomy. No plans or need for continued GI f/u. Signing off. Re-consult as needed. Thank you.
Abdominal pain radiating to back, lipase of 1300, CT scan showing mild pancreatitis  Possibly secondary to gall stones and/or possible cholangitis given elevated LFTs  RUQ/abdominal US- dilated CBD with mild intrahepatic biliary dilation  Lipase downtrended to 479, pending AM repeat this morning  Patient given IVF bolus in the ED  Will start NS at 200 cc/hr  - MRI w CBD stones and  interstitial edematous pancreatitis.   - Patient currently NPO for MRCP this AM as per GI
Abdominal pain radiating to back, lipase of 1300, CT scan showing mild pancreatitis  Possibly secondary to gall stones and/or possible cholangitis given elevated LFTs  RUQ/abdominal US- dilated CBD with mild intrahepatic biliary dilation  Lipase downtrended to 479, pending AM repeat this morning  Patient given IVF bolus in the ED  Will start NS at 200 cc/hr  - MRI w CBD stones and  interstitial edematous pancreatitis.   - now s/p ERCP with removal of 12mm CBD stone  - Patient for cholecystectomy once bradycardia resolved
Abdominal pain radiating to back, lipase of 1300, CT scan showing mild pancreatitis  Possibly secondary to gall stones and/or possible cholangitis given elevated LFTs  RUQ/abdominal US- dilated CBD with mild intrahepatic biliary dilation  Lipase downtrended to 479, pending AM repeat this morning  Patient given IVF bolus in the ED  Will start NS at 200 cc/hr  - MRI w CBD stones and  interstitial edematous pancreatitis.   -  s/p ERCP 5/14  with removal of 12mm CBD stone  - now s/p lap marsha 5/15 w/o complications
Abdominal pain radiating to back, lipase of 1300, CT scan showing mild pancreatitis  Possibly secondary to gall stones and/or possible cholangitis given elevated LFTs  RUQ/abdominal US- dilated CBD with mild intrahepatic biliary dilation  Lipase downtrended to 479, pending AM repeat this morning  Patient given IVF bolus in the ED  Will start NS at 200 cc/hr  Patient currently NPO

## 2018-05-22 LAB — SURGICAL PATHOLOGY FINAL REPORT - CH: SIGNIFICANT CHANGE UP

## 2018-05-23 PROBLEM — F41.9 ANXIETY DISORDER, UNSPECIFIED: Chronic | Status: ACTIVE | Noted: 2018-05-11

## 2018-05-23 RX ORDER — ROSUVASTATIN CALCIUM 20 MG/1
20 TABLET, FILM COATED ORAL DAILY
Qty: 90 | Refills: 3 | Status: ACTIVE | COMMUNITY

## 2018-05-23 RX ORDER — OMEPRAZOLE 40 MG/1
40 CAPSULE, DELAYED RELEASE ORAL
Qty: 90 | Refills: 3 | Status: ACTIVE | COMMUNITY

## 2018-05-23 RX ORDER — VIT C/E/ZN/COPPR/LUTEIN/ZEAXAN 250MG-90MG
CAPSULE ORAL
Refills: 0 | Status: DISCONTINUED | COMMUNITY
End: 2018-05-23

## 2018-05-24 ENCOUNTER — APPOINTMENT (OUTPATIENT)
Dept: CARDIOLOGY | Facility: CLINIC | Age: 78
End: 2018-05-24
Payer: COMMERCIAL

## 2018-05-24 VITALS
WEIGHT: 160 LBS | HEIGHT: 66 IN | BODY MASS INDEX: 25.71 KG/M2 | SYSTOLIC BLOOD PRESSURE: 130 MMHG | HEART RATE: 67 BPM | RESPIRATION RATE: 14 BRPM | DIASTOLIC BLOOD PRESSURE: 83 MMHG

## 2018-05-24 DIAGNOSIS — I10 ESSENTIAL (PRIMARY) HYPERTENSION: ICD-10-CM

## 2018-05-24 DIAGNOSIS — I25.10 ATHEROSCLEROTIC HEART DISEASE OF NATIVE CORONARY ARTERY W/OUT ANGINA PECTORIS: ICD-10-CM

## 2018-05-24 DIAGNOSIS — I48.0 PAROXYSMAL ATRIAL FIBRILLATION: ICD-10-CM

## 2018-05-24 DIAGNOSIS — E78.5 HYPERLIPIDEMIA, UNSPECIFIED: ICD-10-CM

## 2018-05-24 PROCEDURE — 99214 OFFICE O/P EST MOD 30 MIN: CPT

## 2018-05-24 PROCEDURE — 93000 ELECTROCARDIOGRAM COMPLETE: CPT

## 2018-05-24 RX ORDER — LISINOPRIL 20 MG/1
20 TABLET ORAL DAILY
Qty: 90 | Refills: 3 | Status: DISCONTINUED | COMMUNITY
End: 2018-05-24

## 2018-05-24 RX ORDER — HYDROCHLOROTHIAZIDE 12.5 MG/1
12.5 TABLET ORAL
Qty: 90 | Refills: 3 | Status: DISCONTINUED | COMMUNITY
Start: 2018-03-07 | End: 2018-05-24

## 2018-05-31 ENCOUNTER — APPOINTMENT (OUTPATIENT)
Dept: TRAUMA SURGERY | Facility: CLINIC | Age: 78
End: 2018-05-31
Payer: MEDICARE

## 2018-05-31 VITALS
RESPIRATION RATE: 14 BRPM | OXYGEN SATURATION: 96 % | TEMPERATURE: 97.8 F | WEIGHT: 160 LBS | HEART RATE: 71 BPM | BODY MASS INDEX: 25.71 KG/M2 | SYSTOLIC BLOOD PRESSURE: 173 MMHG | DIASTOLIC BLOOD PRESSURE: 92 MMHG | HEIGHT: 66 IN

## 2018-05-31 PROCEDURE — 99024 POSTOP FOLLOW-UP VISIT: CPT

## 2018-06-18 ENCOUNTER — RX RENEWAL (OUTPATIENT)
Age: 78
End: 2018-06-18

## 2018-06-18 RX ORDER — HYDROCHLOROTHIAZIDE 12.5 MG/1
12.5 TABLET ORAL
Qty: 30 | Refills: 0 | Status: ACTIVE | COMMUNITY
Start: 2018-06-18 | End: 1900-01-01

## 2020-02-10 NOTE — H&P ADULT - EXTREMITIES
I have personally seen and examined this patient.  I have fully participated in the care of this patient. I have reviewed all pertinent clinical information, including history, physical exam, plan and the Resident’s note and agree except as noted.
No cyanosis, clubbing or edema

## 2021-01-01 NOTE — PROGRESS NOTE ADULT - PROBLEM SELECTOR PLAN 8
lovenox 40 mg / d
DVT ppx: SCD ppx for now
Statement Selected

## 2021-02-21 NOTE — ED PROVIDER NOTE - PROGRESS NOTE DETAILS
Dr Marcus Scott at bedside administering bilateral nasal tampons Patient signed out to Dr. Chatterjee pending CT angio results and likely discharge home. Patient reassessed and notes pain is 1/10.

## 2021-08-31 NOTE — ED ADULT NURSE NOTE - MUSCULOSKELETAL WDL
Patient's wife called stating they would like to get a handicap placard. Was wondering since patient is on oxygen if you would write for one. Please advise. Full range of motion of upper and lower extremities, no joint tenderness/swelling.

## 2021-11-05 NOTE — ED ADULT TRIAGE NOTE - SPO2 (%)
Patient called from [phone] and stated that she would like to go back to work 5 days a week, she is wearing her brace and she stated she is doing good. pt needs letter faxed to her employer [employer] to let them know it would be OK to start back working 5 days a week. Thank you she did not have fax number at time of calling and stated the office should have it- please reach out to pt is needed. 95

## 2022-03-03 NOTE — ED ADULT NURSE NOTE - PSH
Reviewed PTA medication list with patient/caregiver and patient/caregiver denies any additional medications. Patient admits to having a responsible adult care for them at home for at least 24 hours after surgery. Patient encouraged to use gown warming system and informed that using said warming gown to regulate body temperature prior to a procedure has been shown to help reduce the risks of blood clots and infection. Patient's pharmacy of choice verified and documented in PTA medication section. Dual skin assessment & fall risk band verification completed with Matt Castillo RN. History of open heart surgery

## 2022-08-02 NOTE — PROGRESS NOTE ADULT - ATTENDING COMMENTS
I have seen and examined the patient, reviewed the chart, ;abs and Diagnostics and I agree with assessment and plan as above with family medicine resident Aaron    S/P ERCP and stone removed 5/14  Plan for Lap Lynda today  Clear liquids after surgery and if no more pain , then advance slowly in am  -
I have seen and examined the patient, reviewed the chart, labs and diagnostics and I agree with assessment and plan as above with family medicine resident Joe Castro)    Home today
I have seen and examined the patient, reviewed the chart, labs and diagnostics and I agree with assessment and plan as above with family medicine resident Sarabjti Walker    S/P Lap cholecystectomy, doing fine  No Pain this am  advance diet as tolerated. Stop fluids, and stop tele  Anticipate Home in am
I have personally seen and examined the patient, reviewed the chart, labs and diagnostics and I agree with assessment and plan as above with family medicine resident     MRCP pending  Dilated CBD. GI on board. Cardiology consulted for Cardiac clearance for ERCP/EUS  Hypomagnesemia : replaced with IV Magnesium
General

## 2023-02-02 NOTE — DIETITIAN INITIAL EVALUATION ADULT. - PATIENT REFUSES SNACKS AND/OR SUPPLEMENTS
Anesthesia confirms case reviewed for anesthesia risk alert. Statement Selected Anesthesia confirms case reviewed for anesthesia risk alert.

## 2023-05-02 NOTE — ED PROVIDER NOTE - AGGRAVATING FACTORS
Renal function worsened to creatinine 2 3, recommended increasing oral hydration, baseline has been around 2, check BMP before the office visit which is a week from now  Recommended avoiding metformin as GFR is currently less than 30  Hemoglobin stable    We will discuss about low vitamin D and PTH elevated during the office visit none

## 2024-04-14 NOTE — DISCHARGE NOTE ADULT - CARE PROVIDER_API CALL
Susan Santiago), Surgery  [address] [place] [address]  Phone: [phone]  Fax: [phone]    Tristen Benson), Gastroenterology; Internal Medicine  [address] [place] [address]  Phone: [phone]  Fax: [phone]
Yes

## 2024-07-01 NOTE — ED ADULT NURSE NOTE - PMH
Where Is Your Acne Located?: Face Additional Comments (Use Complete Sentences): Patient notes history of cystic acne. Patient discontinued oral birth control a couple of months ago. Patient notes acne has improved since. Patient presents for evaluation and management. Anxiety    Vaughn esophagus    GERD (gastroesophageal reflux disease)    Hyperlipidemia    Hypertension    Palpitations    Paroxysmal atrial fibrillation    PVC's (premature ventricular contractions)